# Patient Record
Sex: FEMALE | Race: WHITE | Employment: UNEMPLOYED | URBAN - NONMETROPOLITAN AREA
[De-identification: names, ages, dates, MRNs, and addresses within clinical notes are randomized per-mention and may not be internally consistent; named-entity substitution may affect disease eponyms.]

---

## 2017-09-19 ENCOUNTER — HOSPITAL ENCOUNTER (OUTPATIENT)
Age: 22
Setting detail: OBSERVATION
Discharge: HOME | End: 2017-09-19
Attending: OBSTETRICS & GYNECOLOGY | Admitting: OBSTETRICS & GYNECOLOGY
Payer: COMMERCIAL

## 2017-09-19 VITALS
OXYGEN SATURATION: 98 % | HEART RATE: 99 BPM | RESPIRATION RATE: 18 BRPM | BODY MASS INDEX: 32.99 KG/M2 | SYSTOLIC BLOOD PRESSURE: 126 MMHG | HEIGHT: 65 IN | WEIGHT: 198 LBS | TEMPERATURE: 97.3 F | DIASTOLIC BLOOD PRESSURE: 72 MMHG

## 2017-09-19 LAB — AMNISURE PATIENT RESULT: NORMAL

## 2017-09-19 PROCEDURE — G0379 DIRECT REFER HOSPITAL OBSERV: HCPCS

## 2017-09-19 PROCEDURE — G0378 HOSPITAL OBSERVATION PER HR: HCPCS

## 2017-09-19 PROCEDURE — 84112 EVAL AMNIOTIC FLUID PROTEIN: CPT

## 2017-09-19 RX ORDER — DIPHENHYDRAMINE HCL 25 MG
25 TABLET ORAL NIGHTLY PRN
Status: ON HOLD | COMMUNITY
End: 2020-07-22 | Stop reason: ALTCHOICE

## 2017-10-01 ENCOUNTER — HOSPITAL ENCOUNTER (OUTPATIENT)
Age: 22
Discharge: HOME | End: 2017-10-02
Attending: OBSTETRICS & GYNECOLOGY | Admitting: OBSTETRICS & GYNECOLOGY
Payer: COMMERCIAL

## 2017-10-01 RX ORDER — OXYCODONE HYDROCHLORIDE AND ACETAMINOPHEN 5; 325 MG/1; MG/1
2 TABLET ORAL ONCE
Status: COMPLETED | OUTPATIENT
Start: 2017-10-01 | End: 2017-10-02

## 2017-10-01 NOTE — IP AVS SNAPSHOT
After Visit Summary  (Discharge Instructions)    Medication List for Home    Based on the information you provided to us as well as any changes during this visit, the following is your updated medication list.  Compare this with your prescription bottles at home. If you have any questions or concerns, contact your primary care physician's office. Daily Medication List (This medication list can be shared with any healthcare provider who is helping you manage your medications)      ASK your doctor about these medications if you have questions        Last Dose    Next Dose Due AM NOON PM NIGHT    BENADRYL 25 MG tablet   Generic drug:  diphenhydrAMINE   Take 25 mg by mouth nightly as needed for Itching or Sleep                                                 Allergies as of 10/2/2017        Reactions    Amoxicillin-pot Clavulanate Nausea Only    Codeine Nausea Only      Immunizations as of 10/2/2017     No immunizations on file. Last Vitals          Most Recent Value    Temp      Pulse      Resp  18    BP           After Visit Summary    This summary was created for you. Thank you for entrusting your care to us. The following information includes details about your hospital/visit stay along with steps you should take to help with your recovery once you leave the hospital.  In this packet, you will find information about the topics listed below:    · Instructions about your medications including a list of your home medications  · A summary of your hospital visit  · Follow-up appointments once you have left the hospital  · Your care plan at home      You may receive a survey regarding the care you received during your stay. Your input is valuable to us. We encourage you to complete and return your survey in the envelope provided. We hope you will choose us in the future for your healthcare needs.           Patient Information     Patient Name Horace KAUR 1995 Care Plan Once You Return Home    This section includes instructions you will need to follow once you leave the hospital.  Your care team will discuss these with you, so you and those caring for you know how to best care for your health needs at home. This section may also include educational information about certain health topics that may be of help to you. Discharge Instructions       Home Undelivered Discharge Instructions    After Discharge Orders:           · Diet: regular diet   Increase fluid intake to 8-10 glasses of water daily    · Rest: normal activity as tolerated    Other instructions: Do kick counts once a day on your baby. Choose the time of day your baby is most active. Get in a comfortable lying or sitting position and time how long it takes to feel 10 kicks, twists, turns, swishes, or rolls. Call Your Doctor if Any of the Following Occurs   1. Leaking fluid from vagina with or without contractions  2. Bright red vaginal bleeding occurs that is as heavy as or heavier than a period  3. Regular contractions are longer, stronger, and closer together  4. Noticeable decreased fetal movement  5. Elevated temperature >100.5°F and chills  6. Blurred vision, spots before eyes, unrelievable headache, severe facial swelling, or upper abdominal pain  Contact physician or hospital OB unit if signs of premature labor occur at ?36 weeks  1. Persistent or rhythmic low back pain that feels different than you are used to  2. Menstrual like cramps  3. Intestinal cramps with or without diarrhea  4. Pelvic pressure or rhythmic tightening that feels different than you are used to  5. Watery discharge or a gush of fluid from your vagina  6. Vaginal bleeding as heavy as a period  General Information     Difference between:  False Labor True Labor   1. Contractions often are irregular and don't consistently get closer together (called Aleksander-Berman contractions) 1.  Contractions come at

## 2017-10-02 VITALS — BODY MASS INDEX: 31.32 KG/M2 | RESPIRATION RATE: 18 BRPM | HEIGHT: 65 IN | WEIGHT: 188 LBS

## 2017-10-02 PROCEDURE — 6370000000 HC RX 637 (ALT 250 FOR IP): Performed by: OBSTETRICS & GYNECOLOGY

## 2017-10-02 PROCEDURE — 59025 FETAL NON-STRESS TEST: CPT

## 2017-10-02 RX ADMIN — OXYCODONE HYDROCHLORIDE AND ACETAMINOPHEN 2 TABLET: 5; 325 TABLET ORAL at 00:21

## 2017-10-02 ASSESSMENT — PAIN SCALES - GENERAL: PAINLEVEL_OUTOF10: 7

## 2017-10-02 NOTE — FLOWSHEET NOTE
Discharge teaching provided, pt encouraged to make sure she is drinking 8-10 large glasses of water a day and doing her kick counts as she was instructed to do. Pt instructed to return or notify her physician is she notices any further leaking of fluids, vaginal bleeding heavier than a period, decreased fetal movement, temp not relieved by tylenol, contractions that are time able, or any other concerns she may have, pt voiced understanding, signatures obtained, copy of discharge papers provided to pt. EFM and toco removed pt up to change. Pt states she is comfortable going home.

## 2017-10-02 NOTE — FLOWSHEET NOTE
Dr. Vineet Guzman notified of pt arrival c/o leaking fluid and contractions rating 6/10. SVE, negative amnisure. Orders received.

## 2017-10-02 NOTE — FLOWSHEET NOTE
Pt here with c/o ctx and ? SROM. Pt states ctx started at 2000 and feeling baby move less. Pt gowned, EFM to be applied and amnisure to be obtained.

## 2017-10-13 ENCOUNTER — ANESTHESIA (OUTPATIENT)
Dept: LABOR AND DELIVERY | Age: 22
End: 2017-10-13
Payer: COMMERCIAL

## 2017-10-13 ENCOUNTER — ANESTHESIA EVENT (OUTPATIENT)
Dept: LABOR AND DELIVERY | Age: 22
End: 2017-10-13
Payer: COMMERCIAL

## 2017-10-13 ENCOUNTER — HOSPITAL ENCOUNTER (INPATIENT)
Age: 22
LOS: 2 days | Discharge: HOME | End: 2017-10-15
Attending: OBSTETRICS & GYNECOLOGY | Admitting: OBSTETRICS & GYNECOLOGY
Payer: COMMERCIAL

## 2017-10-13 LAB
ABO: NORMAL
AMPHETAMINE+METHAMPHETAMINE URINE SCREEN: NEGATIVE
ANTIBODY SCREEN: NORMAL
BARBITURATE QUANTITATIVE URINE: NEGATIVE
BASOPHILS # BLD: 0.3 %
BASOPHILS ABSOLUTE: 0 THOU/MM3 (ref 0–0.1)
BENZODIAZEPINE QUANTITATIVE URINE: NEGATIVE
CANNABINOID QUANTITATIVE URINE: NEGATIVE
COCAINE METABOLITE QUANTITATIVE URINE: NEGATIVE
EOSINOPHIL # BLD: 1.3 %
EOSINOPHILS ABSOLUTE: 0.2 THOU/MM3 (ref 0–0.4)
HCT VFR BLD CALC: 37.3 % (ref 37–47)
HEMOGLOBIN: 12.6 GM/DL (ref 12–16)
LYMPHOCYTES # BLD: 16.8 %
LYMPHOCYTES ABSOLUTE: 2.1 THOU/MM3 (ref 1–4.8)
MCH RBC QN AUTO: 31.5 PG (ref 27–31)
MCHC RBC AUTO-ENTMCNC: 33.7 GM/DL (ref 33–37)
MCV RBC AUTO: 93.5 FL (ref 81–99)
MONOCYTES # BLD: 7.5 %
MONOCYTES ABSOLUTE: 0.9 THOU/MM3 (ref 0.4–1.3)
NUCLEATED RED BLOOD CELLS: 0 /100 WBC
OPIATES, URINE: NEGATIVE
OXYCODONE: NEGATIVE
PDW BLD-RTO: 13.8 % (ref 11.5–14.5)
PHENCYCLIDINE QUANTITATIVE URINE: NEGATIVE
PLATELET # BLD: 214 THOU/MM3 (ref 130–400)
PMV BLD AUTO: 9.9 MCM (ref 7.4–10.4)
RBC # BLD: 3.99 MILL/MM3 (ref 4.2–5.4)
RBC # BLD: NORMAL 10*6/UL
RH FACTOR: NORMAL
SEG NEUTROPHILS: 74.1 %
SEGMENTED NEUTROPHILS ABSOLUTE COUNT: 9.3 THOU/MM3 (ref 1.8–7.7)
WBC # BLD: 12.6 THOU/MM3 (ref 4.8–10.8)

## 2017-10-13 PROCEDURE — 6360000002 HC RX W HCPCS: Performed by: NURSE ANESTHETIST, CERTIFIED REGISTERED

## 2017-10-13 PROCEDURE — 6360000002 HC RX W HCPCS: Performed by: OBSTETRICS & GYNECOLOGY

## 2017-10-13 PROCEDURE — 86592 SYPHILIS TEST NON-TREP QUAL: CPT

## 2017-10-13 PROCEDURE — 86850 RBC ANTIBODY SCREEN: CPT

## 2017-10-13 PROCEDURE — 2580000003 HC RX 258: Performed by: OBSTETRICS & GYNECOLOGY

## 2017-10-13 PROCEDURE — 36415 COLL VENOUS BLD VENIPUNCTURE: CPT

## 2017-10-13 PROCEDURE — 10907ZC DRAINAGE OF AMNIOTIC FLUID, THERAPEUTIC FROM PRODUCTS OF CONCEPTION, VIA NATURAL OR ARTIFICIAL OPENING: ICD-10-PCS | Performed by: OBSTETRICS & GYNECOLOGY

## 2017-10-13 PROCEDURE — 1200000000 HC SEMI PRIVATE

## 2017-10-13 PROCEDURE — A6258 TRANSPARENT FILM >16<=48 IN: HCPCS

## 2017-10-13 PROCEDURE — 3E0P3VZ INTRODUCTION OF HORMONE INTO FEMALE REPRODUCTIVE, PERCUTANEOUS APPROACH: ICD-10-PCS | Performed by: OBSTETRICS & GYNECOLOGY

## 2017-10-13 PROCEDURE — 2500000003 HC RX 250 WO HCPCS: Performed by: ANESTHESIOLOGY

## 2017-10-13 PROCEDURE — 86900 BLOOD TYPING SEROLOGIC ABO: CPT

## 2017-10-13 PROCEDURE — 85025 COMPLETE CBC W/AUTO DIFF WBC: CPT

## 2017-10-13 PROCEDURE — 6360000002 HC RX W HCPCS

## 2017-10-13 PROCEDURE — 7200000001 HC VAGINAL DELIVERY

## 2017-10-13 PROCEDURE — 86901 BLOOD TYPING SEROLOGIC RH(D): CPT

## 2017-10-13 PROCEDURE — 80307 DRUG TEST PRSMV CHEM ANLYZR: CPT

## 2017-10-13 RX ORDER — ACETAMINOPHEN 325 MG/1
650 TABLET ORAL EVERY 4 HOURS PRN
Status: DISCONTINUED | OUTPATIENT
Start: 2017-10-13 | End: 2017-10-15 | Stop reason: HOSPADM

## 2017-10-13 RX ORDER — DEXTROSE MONOHYDRATE 50 MG/ML
INJECTION, SOLUTION INTRAVENOUS
Status: DISCONTINUED
Start: 2017-10-13 | End: 2017-10-13

## 2017-10-13 RX ORDER — METHYLERGONOVINE MALEATE 0.2 MG/ML
200 INJECTION INTRAVENOUS SEE ADMIN INSTRUCTIONS
Status: DISCONTINUED | OUTPATIENT
Start: 2017-10-13 | End: 2017-10-15 | Stop reason: HOSPADM

## 2017-10-13 RX ORDER — MORPHINE SULFATE 2 MG/ML
2 INJECTION, SOLUTION INTRAMUSCULAR; INTRAVENOUS
Status: DISCONTINUED | OUTPATIENT
Start: 2017-10-13 | End: 2017-10-15 | Stop reason: HOSPADM

## 2017-10-13 RX ORDER — MORPHINE SULFATE 4 MG/ML
4 INJECTION, SOLUTION INTRAMUSCULAR; INTRAVENOUS
Status: DISCONTINUED | OUTPATIENT
Start: 2017-10-13 | End: 2017-10-13

## 2017-10-13 RX ORDER — LANOLIN 100 %
OINTMENT (GRAM) TOPICAL PRN
Status: DISCONTINUED | OUTPATIENT
Start: 2017-10-13 | End: 2017-10-15 | Stop reason: HOSPADM

## 2017-10-13 RX ORDER — ONDANSETRON 2 MG/ML
4 INJECTION INTRAMUSCULAR; INTRAVENOUS EVERY 6 HOURS PRN
Status: DISCONTINUED | OUTPATIENT
Start: 2017-10-13 | End: 2017-10-15 | Stop reason: HOSPADM

## 2017-10-13 RX ORDER — SODIUM CHLORIDE, SODIUM LACTATE, POTASSIUM CHLORIDE, CALCIUM CHLORIDE 600; 310; 30; 20 MG/100ML; MG/100ML; MG/100ML; MG/100ML
INJECTION, SOLUTION INTRAVENOUS CONTINUOUS
Status: DISCONTINUED | OUTPATIENT
Start: 2017-10-13 | End: 2017-10-15 | Stop reason: HOSPADM

## 2017-10-13 RX ORDER — ROPIVACAINE HYDROCHLORIDE 2 MG/ML
INJECTION, SOLUTION EPIDURAL; INFILTRATION; PERINEURAL PRN
Status: DISCONTINUED | OUTPATIENT
Start: 2017-10-13 | End: 2017-10-14 | Stop reason: SDUPTHER

## 2017-10-13 RX ORDER — IBUPROFEN 800 MG/1
800 TABLET ORAL 3 TIMES DAILY
Status: DISCONTINUED | OUTPATIENT
Start: 2017-10-13 | End: 2017-10-15 | Stop reason: HOSPADM

## 2017-10-13 RX ORDER — ACETAMINOPHEN 325 MG/1
650 TABLET ORAL EVERY 4 HOURS PRN
Status: DISCONTINUED | OUTPATIENT
Start: 2017-10-13 | End: 2017-10-13 | Stop reason: SDUPTHER

## 2017-10-13 RX ORDER — SODIUM CHLORIDE 0.9 % (FLUSH) 0.9 %
10 SYRINGE (ML) INJECTION EVERY 12 HOURS SCHEDULED
Status: DISCONTINUED | OUTPATIENT
Start: 2017-10-13 | End: 2017-10-15 | Stop reason: HOSPADM

## 2017-10-13 RX ORDER — HYDROCODONE BITARTRATE AND ACETAMINOPHEN 5; 325 MG/1; MG/1
1 TABLET ORAL EVERY 4 HOURS PRN
Status: DISCONTINUED | OUTPATIENT
Start: 2017-10-13 | End: 2017-10-15 | Stop reason: HOSPADM

## 2017-10-13 RX ORDER — CARBOPROST TROMETHAMINE 250 UG/ML
250 INJECTION, SOLUTION INTRAMUSCULAR PRN
Status: DISCONTINUED | OUTPATIENT
Start: 2017-10-13 | End: 2017-10-14 | Stop reason: SDUPTHER

## 2017-10-13 RX ORDER — MORPHINE SULFATE 4 MG/ML
4 INJECTION, SOLUTION INTRAMUSCULAR; INTRAVENOUS
Status: DISCONTINUED | OUTPATIENT
Start: 2017-10-13 | End: 2017-10-15 | Stop reason: HOSPADM

## 2017-10-13 RX ORDER — LIDOCAINE HYDROCHLORIDE 10 MG/ML
30 INJECTION, SOLUTION EPIDURAL; INFILTRATION; INTRACAUDAL; PERINEURAL PRN
Status: DISCONTINUED | OUTPATIENT
Start: 2017-10-13 | End: 2017-10-13

## 2017-10-13 RX ORDER — CARBOPROST TROMETHAMINE 250 UG/ML
250 INJECTION, SOLUTION INTRAMUSCULAR
Status: DISPENSED | OUTPATIENT
Start: 2017-10-13 | End: 2017-10-13

## 2017-10-13 RX ORDER — NALOXONE HYDROCHLORIDE 0.4 MG/ML
0.4 INJECTION, SOLUTION INTRAMUSCULAR; INTRAVENOUS; SUBCUTANEOUS PRN
Status: DISCONTINUED | OUTPATIENT
Start: 2017-10-13 | End: 2017-10-13

## 2017-10-13 RX ORDER — HYDROCODONE BITARTRATE AND ACETAMINOPHEN 5; 325 MG/1; MG/1
2 TABLET ORAL EVERY 4 HOURS PRN
Status: DISCONTINUED | OUTPATIENT
Start: 2017-10-13 | End: 2017-10-15 | Stop reason: HOSPADM

## 2017-10-13 RX ORDER — TERBUTALINE SULFATE 1 MG/ML
0.25 INJECTION, SOLUTION SUBCUTANEOUS ONCE
Status: DISCONTINUED | OUTPATIENT
Start: 2017-10-13 | End: 2017-10-13

## 2017-10-13 RX ORDER — IBUPROFEN 800 MG/1
800 TABLET ORAL EVERY 8 HOURS PRN
Status: DISCONTINUED | OUTPATIENT
Start: 2017-10-13 | End: 2017-10-13

## 2017-10-13 RX ORDER — SODIUM CHLORIDE, SODIUM LACTATE, POTASSIUM CHLORIDE, CALCIUM CHLORIDE 600; 310; 30; 20 MG/100ML; MG/100ML; MG/100ML; MG/100ML
INJECTION, SOLUTION INTRAVENOUS CONTINUOUS
Status: DISCONTINUED | OUTPATIENT
Start: 2017-10-13 | End: 2017-10-13

## 2017-10-13 RX ORDER — MORPHINE SULFATE 2 MG/ML
2 INJECTION, SOLUTION INTRAMUSCULAR; INTRAVENOUS
Status: DISCONTINUED | OUTPATIENT
Start: 2017-10-13 | End: 2017-10-13

## 2017-10-13 RX ORDER — DIPHENHYDRAMINE HCL 25 MG
25 TABLET ORAL EVERY 4 HOURS PRN
Status: DISCONTINUED | OUTPATIENT
Start: 2017-10-13 | End: 2017-10-15 | Stop reason: HOSPADM

## 2017-10-13 RX ORDER — ONDANSETRON 2 MG/ML
8 INJECTION INTRAMUSCULAR; INTRAVENOUS EVERY 6 HOURS PRN
Status: DISCONTINUED | OUTPATIENT
Start: 2017-10-13 | End: 2017-10-13

## 2017-10-13 RX ORDER — DOCUSATE SODIUM 100 MG/1
100 CAPSULE, LIQUID FILLED ORAL 2 TIMES DAILY PRN
Status: DISCONTINUED | OUTPATIENT
Start: 2017-10-13 | End: 2017-10-15 | Stop reason: HOSPADM

## 2017-10-13 RX ORDER — ROPIVACAINE HYDROCHLORIDE 2 MG/ML
INJECTION, SOLUTION EPIDURAL; INFILTRATION; PERINEURAL
Status: DISCONTINUED
Start: 2017-10-13 | End: 2017-10-13

## 2017-10-13 RX ORDER — ZOLPIDEM TARTRATE 10 MG/1
10 TABLET ORAL NIGHTLY PRN
Status: DISCONTINUED | OUTPATIENT
Start: 2017-10-14 | End: 2017-10-15 | Stop reason: HOSPADM

## 2017-10-13 RX ORDER — FERROUS SULFATE 325(65) MG
325 TABLET ORAL
Status: DISCONTINUED | OUTPATIENT
Start: 2017-10-14 | End: 2017-10-15 | Stop reason: HOSPADM

## 2017-10-13 RX ORDER — ONDANSETRON 4 MG/1
8 TABLET, FILM COATED ORAL EVERY 8 HOURS PRN
Status: DISCONTINUED | OUTPATIENT
Start: 2017-10-13 | End: 2017-10-15 | Stop reason: HOSPADM

## 2017-10-13 RX ORDER — PENICILLIN G POTASSIUM 5000000 [IU]/1
INJECTION, POWDER, FOR SOLUTION INTRAMUSCULAR; INTRAVENOUS
Status: DISCONTINUED
Start: 2017-10-13 | End: 2017-10-13

## 2017-10-13 RX ORDER — DIPHENHYDRAMINE HYDROCHLORIDE 50 MG/ML
25 INJECTION INTRAMUSCULAR; INTRAVENOUS EVERY 4 HOURS PRN
Status: DISCONTINUED | OUTPATIENT
Start: 2017-10-13 | End: 2017-10-13

## 2017-10-13 RX ORDER — SODIUM CHLORIDE 0.9 % (FLUSH) 0.9 %
10 SYRINGE (ML) INJECTION PRN
Status: DISCONTINUED | OUTPATIENT
Start: 2017-10-13 | End: 2017-10-15 | Stop reason: HOSPADM

## 2017-10-13 RX ORDER — ONDANSETRON 2 MG/ML
4 INJECTION INTRAMUSCULAR; INTRAVENOUS EVERY 6 HOURS PRN
Status: DISCONTINUED | OUTPATIENT
Start: 2017-10-13 | End: 2017-10-13

## 2017-10-13 RX ORDER — BUTORPHANOL TARTRATE 1 MG/ML
1 INJECTION, SOLUTION INTRAMUSCULAR; INTRAVENOUS
Status: DISCONTINUED | OUTPATIENT
Start: 2017-10-13 | End: 2017-10-13

## 2017-10-13 RX ORDER — METHYLERGONOVINE MALEATE 0.2 MG/ML
200 INJECTION INTRAVENOUS PRN
Status: DISCONTINUED | OUTPATIENT
Start: 2017-10-13 | End: 2017-10-13 | Stop reason: SDUPTHER

## 2017-10-13 RX ADMIN — SODIUM CHLORIDE, POTASSIUM CHLORIDE, SODIUM LACTATE AND CALCIUM CHLORIDE: 600; 310; 30; 20 INJECTION, SOLUTION INTRAVENOUS at 19:58

## 2017-10-13 RX ADMIN — SODIUM CHLORIDE, POTASSIUM CHLORIDE, SODIUM LACTATE AND CALCIUM CHLORIDE: 600; 310; 30; 20 INJECTION, SOLUTION INTRAVENOUS at 15:18

## 2017-10-13 RX ADMIN — Medication 1 MILLI-UNITS/MIN: at 15:30

## 2017-10-13 RX ADMIN — DEXTROSE MONOHYDRATE 2.5 MILLION UNITS: 50 INJECTION, SOLUTION INTRAVENOUS at 19:16

## 2017-10-13 RX ADMIN — ROPIVACAINE HYDROCHLORIDE 10 ML: 2 INJECTION, SOLUTION EPIDURAL; INFILTRATION at 18:00

## 2017-10-13 RX ADMIN — DEXTROSE MONOHYDRATE 5 MILLION UNITS: 5 INJECTION INTRAVENOUS at 15:21

## 2017-10-13 RX ADMIN — Medication 15 ML/HR: at 18:00

## 2017-10-13 RX ADMIN — SODIUM CHLORIDE, POTASSIUM CHLORIDE, SODIUM LACTATE AND CALCIUM CHLORIDE: 600; 310; 30; 20 INJECTION, SOLUTION INTRAVENOUS at 14:30

## 2017-10-13 RX ADMIN — ONDANSETRON 8 MG: 2 INJECTION INTRAMUSCULAR; INTRAVENOUS at 19:31

## 2017-10-13 NOTE — PLAN OF CARE
Problem: Anxiety:  Goal: Level of anxiety will decrease  Level of anxiety will decrease   Outcome: Ongoing  Patient appears calm and cooperative, s/o at bedside, RN offering positive reassurance    Problem: Pain - Acute:  Goal: Able to cope with pain  Able to cope with pain   Outcome: Ongoing  Patient is comfortable with labor epidural    Problem: Breathing Pattern - Ineffective:  Goal: Able to breathe comfortably  Able to breathe comfortably   Outcome: Ongoing  Easy and unlabored respirations, denies SOB    Problem: Fluid Volume - Imbalance:  Goal: Absence of intrapartum hemorrhage signs and symptoms  Absence of intrapartum hemorrhage signs and symptoms   Outcome: Ongoing  No vaginal bleeding noted, will continue to monitor    Problem: Infection - Intrapartum Infection:  Goal: Will show no infection signs and symptoms  Will show no infection signs and symptoms   Outcome: Ongoing  Vitals stable, remains afebrile, receiving PCN for GBS    Problem: Labor Process - Prolonged:  Goal: Uterine contractions within specified parameters  Uterine contractions within specified parameters   Outcome: Ongoing  IUPC in place tracing contraction pattern    Problem: Urinary Retention:  Goal: Urinary elimination within specified parameters  Urinary elimination within specified parameters   Outcome: Ongoing  Munoz catheter in place draining clear yellow urine    Problem: Discharge Planning:  Goal: Discharged to appropriate level of care  Discharged to appropriate level of care   Outcome: Ongoing  Patient aware of 2 hour recovery period on L&D then transfer to mom/baby unit for remainder of stay    Problem: Falls - Risk of:  Goal: Will remain free from falls  Will remain free from falls   Outcome: Ongoing  Patient aware of needing to remain in bed since epidural, call light within reach, bed locked in lowest position. Comments: Care plan reviewed with patient and s/o.   Patient and s/o verbalize understanding of the plan of care and contribute to goal setting.

## 2017-10-13 NOTE — H&P
[address] [address]  History and Physical Update    Pt Name: Alfonso Bustamante  MRN: [id number]  YOB: 1995  Date of evaluation: 10/13/2017    [] I have examined the patient and reviewed the H&P/Consult and there are no changes to the patient or plans. [x] I have examined the patient and reviewed the H&P/Consult and have noted the following changes:   Pt with decreased fetal movement in the office. Sent for IOL  CVX /2. AROM clear. Anticipate .          Lizzy Coates MD  Electronically signed 10/13/2017 at 5:24 PM

## 2017-10-13 NOTE — PLAN OF CARE
labor. Pt will be discharged when discharge criteria met. Problem: Falls - Risk of:  Goal: Will remain free from falls  Will remain free from falls  Outcome: Ongoing  Pt up with assistance. Pt using call light appropriately.

## 2017-10-13 NOTE — ANESTHESIA PRE PROCEDURE
Department of Anesthesiology  Preprocedure Note       Name:  Huma Alcala   Age:  25 y.o.  :  1995                                          MRN:  [id number]         Date:  10/13/2017      Surgeon: * No surgeons listed *    Procedure: * No procedures listed *    Medications prior to admission:   Prior to Admission medications    Medication Sig Start Date End Date Taking?  Authorizing Provider   diphenhydrAMINE (BENADRYL) 25 MG tablet Take 25 mg by mouth nightly as needed for Itching or Sleep   Yes Historical Provider, MD       Current medications:    Current Facility-Administered Medications   Medication Dose Route Frequency Provider Last Rate Last Dose    lactated ringers infusion   Intravenous Continuous Mary Carmen Luo  mL/hr at 10/13/17 1518      lidocaine PF 1 % injection 30 mL  30 mL Other PRN Mary Carmen Luo MD        butorphanol (STADOL) injection 1 mg  1 mg Intravenous Q1H PRN Mary Carmen Luo MD        acetaminophen (TYLENOL) tablet 650 mg  650 mg Oral Q4H PRN Mary Carmen Luo MD        ibuprofen (ADVIL;MOTRIN) tablet 800 mg  800 mg Oral Q8H PRN Mary Carmen Luo MD        morphine (PF) injection 2 mg  2 mg Intravenous Q2H PRN Mary Carmen Luo MD        Or    morphine (PF) injection 4 mg  4 mg Intravenous Q2H PRN Mary Carmen Luo MD        oxytocin (PITOCIN) 30 units in 500 mL infusion  1 alexandre-units/min Intravenous Continuous Mary Carmen Luo MD 6 mL/hr at 10/13/17 1700 6 alexandre-units/min at 10/13/17 1700    diphenhydrAMINE (BENADRYL) injection 25 mg  25 mg Intravenous Q4H PRN Mary Carmen Luo MD        [facility]etron [facility]) injection 8 mg  8 mg Intravenous Q6H PRN Mary Carmen Luo MD        terbutaline (BRETHINE) injection 0.25 mg  0.25 mg Subcutaneous Once Mary Carmen Luo MD        oxytocin (PITOCIN) 30 units in 500 mL infusion  1 alexandre-units/min Intravenous Continuous PRN Mary Carmen Luo MD       [address] [place] [address] from Last 3 Encounters:   10/13/17 194 lb (88 kg)   10/01/17 188 lb (85.3 kg)   09/19/17 198 lb (89.8 kg)     Body mass index is 32.28 kg/m². CBC:   Lab Results   Component Value Date    WBC 12.6 10/13/2017    RBC 3.99 10/13/2017    HGB 12.6 10/13/2017    HCT 37.3 10/13/2017    MCV 93.5 10/13/2017    RDW 13.8 10/13/2017     10/13/2017       CMP: No results found for: NA, K, CL, CO2, BUN, CREATININE, GFRAA, AGRATIO, LABGLOM, GLUCOSE, PROT, CALCIUM, BILITOT, ALKPHOS, AST, ALT    POC Tests: No results for input(s): POCGLU, POCNA, POCK, POCCL, POCBUN, POCHEMO, POCHCT in the last 72 hours. Coags: No results found for: PROTIME, INR, APTT    HCG (If Applicable):   Lab Results   Component Value Date    PREGTESTUR NEGATIVE 01/15/2013        ABGs: No results found for: PHART, PO2ART, TTM8IOF, FTQ6JER, BEART, G3CXYAKL     Type & Screen (If Applicable):  Lab Results   Component Value Date    [address] POS 10/13/2017       Anesthesia Evaluation  Patient summary reviewed and Nursing notes reviewed  Airway: Mallampati: II  TM distance: >3 FB   Neck ROM: full  Mouth opening: > = 3 FB Dental: normal exam         Pulmonary:normal exam    (+) asthma:                            Cardiovascular:negative ROS                      Neuro/Psych:   {neg ROS              GI/Hepatic/Renal: neg ROS            Endo/Other: negative ROS                    Abdominal:           Vascular:                                      Anesthesia Plan      epidural     ASA 2             Anesthetic plan and risks discussed with patient and spouse. Plan discussed with attending.                   Ori Esparza CRNA   10/13/2017

## 2017-10-13 NOTE — FLOWSHEET NOTE
Gr 2 P 1 admitted to labor and delivery from Dr. Sharon Trujillo' office for induction for decreased fetal movement.

## 2017-10-14 LAB — RPR: NONREACTIVE

## 2017-10-14 PROCEDURE — G0008 ADMIN INFLUENZA VIRUS VAC: HCPCS | Performed by: OBSTETRICS & GYNECOLOGY

## 2017-10-14 PROCEDURE — 6370000000 HC RX 637 (ALT 250 FOR IP): Performed by: OBSTETRICS & GYNECOLOGY

## 2017-10-14 PROCEDURE — 1200000000 HC SEMI PRIVATE

## 2017-10-14 PROCEDURE — 6360000002 HC RX W HCPCS: Performed by: OBSTETRICS & GYNECOLOGY

## 2017-10-14 PROCEDURE — 90686 IIV4 VACC NO PRSV 0.5 ML IM: CPT | Performed by: OBSTETRICS & GYNECOLOGY

## 2017-10-14 RX ADMIN — HYDROCODONE BITARTRATE AND ACETAMINOPHEN 2 TABLET: 5; 325 TABLET ORAL at 09:28

## 2017-10-14 RX ADMIN — HYDROCODONE BITARTRATE AND ACETAMINOPHEN 2 TABLET: 5; 325 TABLET ORAL at 22:14

## 2017-10-14 RX ADMIN — HYDROCODONE BITARTRATE AND ACETAMINOPHEN 2 TABLET: 5; 325 TABLET ORAL at 18:12

## 2017-10-14 RX ADMIN — IBUPROFEN 800 MG: 800 TABLET, FILM COATED ORAL at 16:13

## 2017-10-14 RX ADMIN — ZOLPIDEM TARTRATE 10 MG: 10 TABLET, FILM COATED ORAL at 23:59

## 2017-10-14 RX ADMIN — IBUPROFEN 800 MG: 800 TABLET, FILM COATED ORAL at 08:01

## 2017-10-14 RX ADMIN — HYDROCODONE BITARTRATE AND ACETAMINOPHEN 2 TABLET: 5; 325 TABLET ORAL at 13:48

## 2017-10-14 RX ADMIN — DOCUSATE SODIUM 100 MG: 100 CAPSULE, LIQUID FILLED ORAL at 22:14

## 2017-10-14 RX ADMIN — IBUPROFEN 800 MG: 800 TABLET, FILM COATED ORAL at 00:07

## 2017-10-14 RX ADMIN — INFLUENZA A VIRUS A/SINGAPORE/GP1908/2015 IVR-180A (H1N1) ANTIGEN (PROPIOLACTONE INACTIVATED), INFLUENZA A VIRUS A/HONG KONG/4801/2014 X-263B (H3N2) ANTIGEN (PROPIOLACTONE INACTIVATED), INFLUENZA B VIRUS B/BRISBANE/46/2015 ANTIGEN (PROPIOLACTONE INACTIVATED), AND INFLUENZA B VIRUS B/PHUKET/3073/2013 BVR-1B ANTIGEN (PROPIOLACTONE INACTIVATED) 0.5 ML: 15; 15; 15; 15 INJECTION, SUSPENSION INTRAMUSCULAR at 15:12

## 2017-10-14 ASSESSMENT — PAIN SCALES - GENERAL
PAINLEVEL_OUTOF10: 7
PAINLEVEL_OUTOF10: 8
PAINLEVEL_OUTOF10: 5
PAINLEVEL_OUTOF10: 7
PAINLEVEL_OUTOF10: 8
PAINLEVEL_OUTOF10: 5
PAINLEVEL_OUTOF10: 7
PAINLEVEL_OUTOF10: 4

## 2017-10-14 NOTE — ANESTHESIA POSTPROCEDURE EVALUATION
Department of Anesthesiology  Postprocedure Note    Patient: Allene Prader  MRN: [id number]  YOB: 1995  Date of evaluation: 10/14/2017  Time:  1:43 PM     Procedure Summary     Date:  10/13/17 Room / Location:      Anesthesia Start:  1750 Anesthesia Stop:  2037    Procedure:  ANESTHESIA LABOR ANALGESIA Diagnosis:      Scheduled Providers:   Responsible Provider:  Lainey Sandoval MD    Anesthesia Type:  epidural ASA Status:  2          Anesthesia Type: epidural    Kuldeep Phase I: Kuldeep Score: 9    Kuldeep Phase II: Kuldeep Score: 10    Last vitals: Reviewed and per EMR flowsheets. Anesthesia Post Evaluation    Patient location during evaluation: bedside  Patient participation: complete - patient participated  Level of consciousness: awake and alert  Pain score: 9 (headache and neck pain)  Airway patency: patent  Nausea & Vomiting: no nausea and no vomiting  Complications: yes  Specific complications: back pain, neck pain and postural headache  Cardiovascular status: hemodynamically stable  Respiratory status: acceptable and room air  Hydration status: stable  Comments: Patient states headache is worse when sitting up and feels better laying down. Patient has been instructed to continue in the supine position when possible and to take fluids by mouth to stay hydrated. Patient is ordered Norco PO for her neck pain/heacache per Dr. Daniela Gallo. Anesthesia will follow-up and reassess severity of the patient's symptoms and evaluate the need for an epidural blood patch.

## 2017-10-14 NOTE — FLOWSHEET NOTE
Patient awake states no pain in head or neck at this time,on phone in bed,heating pad to neck continues.

## 2017-10-14 NOTE — FLOWSHEET NOTE
Patient transported to [phone] via wheelchair with  in arms in stable condition, FOB at side. Report given to MILLY Castro RN at bedside.

## 2017-10-14 NOTE — FLOWSHEET NOTE
Patient states headache,neck pain a 8 on pain scale. crn anesthetist checked on patient. Patient medicated,heating pad to neck,encouraged,darken room,drink lots of fluid,lie flat in bed.

## 2017-10-14 NOTE — L&D DELIVERY SUMMARY NOTE
Department of Obstetrics and Gynecology  Spontaneous Vaginal Delivery Note      Pt Name: Rex Jaime  MRN: [id number] [place] #: [de-identified]  YOB: 1995  Procedure Performed By: Chanel Price MD      Pre-operative Diagnosis:  Term pregnancy, Induced labor and Pregnancy complicated by: Decreased fetal movement  Post-operative Diagnosis: Same, delivered. Procedure:  Spontaneous vaginal delivery  Surgeon:  Vance Delacruz    Information for the patient's :  Marjan Larson [[id number]]          Anesthesia:  epidural anesthesia  Estimated blood loss:  300ml  Specimen:  Placenta not sent to pathology   Complications:  precipotous delivery from 7-delivered in 20 minutes  Condition:  infant stable to general nursery and mother stable    Details of Procedure: The patient is a 25 y.o. female at 36w3d   OB History      Para Term  AB Living    2 1            SAB TAB Ectopic Molar Multiple Live Births                      who was admitted for induction. She received the following interventions: ARBOW and IV Pitocin induction. The patient progressed well,did receive an epidural, became complete and started to push. She was placed in the dorsal lithotomy position and prepped. She delivered the vertex over an intact perineum . The rest of the infant delivered atraumatically, placed on mother abdomen. The cord was clamped and cut and infant handed off to the waiting nurse for evaluation after mom had adequate time for bonding unless needed to be evaluated sooner. The placenta delivered intact, whole and that the umbilical cord had three vessels noted. The perineum and vagina were explored and a no lacerations were found. A vaginal sweep was performed and there were no retained products and 1 needle was taken off the field. The count was correct.     Delivery Summary:  Information for the patient's :  Marjan Larson [[id number]]                PMH:  Past Medical

## 2017-10-14 NOTE — FLOWSHEET NOTE
Patient c/o headache,neck discomfort,dr gray Rounded on patient,patient medicated,encoughaged to lie flat in bed,darken room and given  Caffeine drink.

## 2017-10-15 VITALS
HEART RATE: 63 BPM | RESPIRATION RATE: 17 BRPM | HEIGHT: 65 IN | SYSTOLIC BLOOD PRESSURE: 112 MMHG | WEIGHT: 194 LBS | TEMPERATURE: 98 F | DIASTOLIC BLOOD PRESSURE: 70 MMHG | OXYGEN SATURATION: 97 % | BODY MASS INDEX: 32.32 KG/M2

## 2017-10-15 PROCEDURE — 6370000000 HC RX 637 (ALT 250 FOR IP): Performed by: OBSTETRICS & GYNECOLOGY

## 2017-10-15 RX ORDER — HYDROCODONE BITARTRATE AND ACETAMINOPHEN 5; 325 MG/1; MG/1
2 TABLET ORAL EVERY 4 HOURS PRN
Qty: 40 TABLET | Refills: 0 | Status: SHIPPED | OUTPATIENT
Start: 2017-10-15 | End: 2017-10-22

## 2017-10-15 RX ADMIN — DOCUSATE SODIUM 100 MG: 100 CAPSULE, LIQUID FILLED ORAL at 08:02

## 2017-10-15 RX ADMIN — IBUPROFEN 800 MG: 800 TABLET, FILM COATED ORAL at 10:00

## 2017-10-15 RX ADMIN — HYDROCODONE BITARTRATE AND ACETAMINOPHEN 2 TABLET: 5; 325 TABLET ORAL at 12:08

## 2017-10-15 RX ADMIN — HYDROCODONE BITARTRATE AND ACETAMINOPHEN 2 TABLET: 5; 325 TABLET ORAL at 08:03

## 2017-10-15 RX ADMIN — IBUPROFEN 800 MG: 800 TABLET, FILM COATED ORAL at 00:01

## 2017-10-15 ASSESSMENT — PAIN SCALES - GENERAL
PAINLEVEL_OUTOF10: 8
PAINLEVEL_OUTOF10: 7
PAINLEVEL_OUTOF10: 5

## 2017-10-15 NOTE — FLOWSHEET NOTE
Infant has roomed in with mother this shift except for after she took Ambien to sleep. She was tearful and hadn't slept. Benefits of rooming in discussed.

## 2017-10-15 NOTE — FLOWSHEET NOTE
Dr. Sandra Castro anesthesiologist in to speak with mom . Assessment completed . Mom verbalized understanding of Dr. Sandra Castro instructions.

## 2017-10-15 NOTE — PLAN OF CARE
Problem: Pain - Acute:  Goal: Pain level will decrease  Pain level will decrease    Outcome: Ongoing  Pain controlled with po meds      Problem: Fluid Volume - Imbalance:  Goal: Absence of postpartum hemorrhage signs and symptoms  Absence of postpartum hemorrhage signs and symptoms   Outcome: Ongoing  Vaginal bleeding WNL, no clots or foul odors. Problem: Discharge Planning:  Goal: Discharged to appropriate level of care  Discharged to appropriate level of care   Outcome: Ongoing  No discharge     Problem: Infection - Risk of, Puerperal Infection:  Goal: Will show no infection signs and symptoms  Will show no infection signs and symptoms   Outcome: Ongoing  Vitals:    10/15/17 0800   BP: 112/70   Pulse: 63   Resp: 17   Temp: 98 °F (36.7 °C)   SpO2:          Problem: Mood - Altered:  Goal: Mood stable  Mood stable   Outcome: Ongoing  Bonding with baby, participating in infant care. Problem: Pain:  Goal: Pain level will decrease  Pain level will decrease    Outcome: Ongoing  Pain controlled with po meds      Comments: Care plan reviewed with patient and she contributes to goal setting and voices understanding of plan of care.

## 2017-10-15 NOTE — FLOWSHEET NOTE
Postpartum education brochure given, teaching complete. [place] postpartum depression screening discussed with patient, instructed to contact her healthcare provider if her score is > 10. Patient voiced understanding.  Mother's blood type is O+

## 2017-11-21 NOTE — L&D DELIVERY SUMMARY NOTE
[address] [place] [address]                              LABOR AND DELIVERY NOTE    PATIENT NAME: Jonathan Diego                       :        1995  MED REC NO:   [id number]                           ROOM:       0006  ACCOUNT NO:   [de-identified]                           ADMIT DATE: 10/01/2017  PROVIDER:     Everton Flores. Alicja Saini M.D.    Rivas Hernandezd:  10/01/2017    Te patient presented to Labor and Delivery at 37 weeks complaining of  contractions. She was found to be in false labor. She had a non-stress  test done, that was reactive.         Rohit Cavazos M.D.    D: 2017 8:57:56       T: 2017 9:44:33     [email or web address]  Job#: [id number]     Doc#: [id number]    CC:

## 2018-03-09 ENCOUNTER — NURSE TRIAGE (OUTPATIENT)
Dept: ADMINISTRATIVE | Age: 23
End: 2018-03-09

## 2018-03-10 NOTE — TELEPHONE ENCOUNTER
Message from [employer] sent at 3/9/2018  8:31 PM EST     Summary: black muscouy stool- had Cdiff    Had her gallbladder removed a cpl weeks ago and now she had cdiff.  Dr Brandi Estevez put her on some meds but told her if she had black or tarry stool for her to call or go to the ER.  She just had a black mucousy stool. Angie Zhu also had abdominal pain.  Please advise. \"I saw Dr Brandi Estevez on Wed and he said to call or go to ED if I had any black tarry stool to call and put me on clear liquids for one week and I have a bad case of C diff. My stomach feels like is about to explode.  \"    I tried to call Dr Brandi Estevez but only # was [facility] which I called they gave me [phone] which was wound clinic and no # to reach  or advised her to go to ED

## 2018-03-10 NOTE — TELEPHONE ENCOUNTER
Reason for Disposition   Tarry or jet black-colored stool (not dark green)    Answer Assessment - Initial Assessment Questions  1. COLOR: \"What color is it? \" \"Is that color in part or all of the stool? \"      Black and tarry and mucousy  2. ONSET: \"When was the unusual color first noted? \"      Today after she ate 2 pieces of pizza and was to be on water and pedilyte for one week since past Wed  3. CAUSE: \"Have you eaten any food or taken any medicine of this color? \" (See listing in BACKGROUND)      no  4. OTHER SYMPTOMS: \"Do you have any other symptoms? \" (e.g., diarrhea, jaundice, abdominal pain, fever).       abd pain and nausea    Protocols used: RECTAL BLEEDING-ADULT-AH, STOOLS - UNUSUAL COLOR-ADULT-AH

## 2018-07-03 NOTE — FLOWSHEET NOTE
Progress Note Hospital Medicine    Patient: Janina Saunders Date: 7/3/2018   female, 44 year old  Admit Date: 6/30/2018   Attending: Atul Anguiano MD      Subjective:  Janina Saunders is a 44 year old female who is being seen in follow up for Acute pyelonephritis     Constipation better, no fever, no chills. Pain improving. Poor sleep- feeling tired. Afebrile           Medications: personally reviewed today in this patient's active orders section of epic  Allergies:   Allergies as of 06/30/2018 - Reviewed 06/30/2018   Allergen Reaction Noted   • Allergy Other (See Comments)    • Demerol Other (See Comments)    • Morphine Other (See Comments)    • Sulfa drugs cross reactors SWELLING, SHORTNESS OF BREATH, and Nausea & Vomiting    • Flagyl [metronidazole hcl] NAUSEA    • Unable to obtain [unknown] Other (See Comments) 04/16/2015       PHYSICAL EXAM:  Visit Vitals  BP (!) 169/94 (BP Location: RUE, Patient Position: Semi-Weathers's) Comment: RN notified   Pulse 70   Temp 99 °F (37.2 °C) (Oral)   Resp 18   Ht 5' 9\" (1.753 m)   Wt 92.3 kg   LMP 05/28/2018 (Exact Date)   SpO2 91%   BMI 30.05 kg/m²     General: A & O X 3 in no acute distress, normal cephalic/atraumatic, Mood and Affect appropriate  Lungs: Clear to auscultation bilaterally  Heart:  Regular rate and rhythm and S1, S2 present  Abdomen: Right flank pain, CVA tenderness;  + B.S.; No guarding or rebound; No peritoneal signs. Cellulitis and induration over laparoscopic site  Extremities: cyanosis absent; no obvious lesions or wounds  Neurologic:  CN 2-12 Grossly intact as best as patient can cooperate with exam, strength is bilaterally intact in all 4 extremities , sensation is grossly intact throughout    Labs:    Recent Labs  Lab 07/03/18 0657 07/02/18 0630 07/01/18  0642   WBC 8.7 11.5* 10.8   RBC 3.39* 3.47* 3.60*   HGB 10.4* 10.6* 11.2*   HCT 32.6* 33.6* 34.2*    208 195   SEG 71 75 63       Recent Labs  Lab 07/03/18 0657 07/02/18  0630  Patient admitted to [phone] from L&D via wheelchair. Report received from AMINA Ortiz RN. Assisted into bed. Oriented to room and plan of care discussed. Call light and bedside table within reach. Side rails up x2. Due to void x 1. Instructed to call nurse for assistance. Verbalized understanding. 07/01/18  0642 06/30/18  1757   SODIUM 139 138 136 137   POTASSIUM 4.0 4.8 4.2 4.2   CHLORIDE 108* 109* 105 103   CO2 23 21 21 24   BUN 17 26* 24* 21*   CREATININE 1.88* 2.69* 2.56* 1.95*   GFRNA 32 21 22 31   GLUCOSE 86 96 96 129*   CALCIUM 8.1* 8.1* 7.6* 8.8   ALBUMIN  --   --   --  3.4*   AST  --   --   --  31   GPT  --   --   --  29   BILIRUBIN  --   --   --  0.4   ALKPT  --   --   --  86     • heparin (porcine)  5,000 Units Subcutaneous 3 times per day   • escitalopram  10 mg Oral Daily   • vitamin - therapeutic multivitamins w/minerals  1 tablet Oral Daily   • piperacillin-tazobactam (ZOSYN) extended interval IVPB  4.5 g Intravenous 3 times per day   • polyethylene glycol  17 g Oral Daily   • sodium chloride (PF)  2 mL Injection 2 times per day       Assessment & Plan:     · Acute Pyelonephritis with hematuria, Right flank and lower abdominal pain - secondary to recent UTI (pt did not complete full course of Ciprofloxacin and Fluconazole at home) vs seeding from recent post-operative wound infection.   - No hydronephrosis but fluid collection lateral to right kidney and bilateral stranding of the perirenal fat (CT abdomen/pelvis)  - Urology was called by ER, no acute intervention needed, no calculus or obstruction. Hematuria is clearing up.  - Aggressive IV hydration @ 150cc/hr. Monitor for volume overload, Echo 2014 EF 58%  - OBGyn consulted, Dr Lion evaluated pt  - No growth in blood and urine culture with mixed growth, repeat pending  - On Zosyn now  - Pain control, supportive care    · Acute Renal failure - improving, secondary to above; possible ATN although no hypotension noted  - Creat 0.90>>1.95>>2.56>>2.69>>1.88  - IV fluids overnight, po fluid intake encouraged  - Avoid nephrotoxic meds and hypotension    · Right Lower Quadrant Wound Infection - erythema/mild cellulitis noted at site  - Fu with OBGyn in am, Dr Lion to see pt tomorrow.     · Depression - mood affect stable.     · Asthma, moderate  persistent stable    · Constipation- bowel regimen ordered    · DVT Prophylaxis- SQ heparin. SCDS and TEDs. Pt ambulating to bathroom    Central Line- none  Parker Catheter- none    Code status: Full Resuscitation    Disposition: Continue in-patient care. Anticipate dc in am    Atul Anguiano MD  Hospitalist  7/3/2018  12:57 PM

## 2020-05-14 ENCOUNTER — TELEPHONE (OUTPATIENT)
Dept: SURGERY | Age: 25
End: 2020-05-14

## 2020-07-22 ENCOUNTER — APPOINTMENT (OUTPATIENT)
Dept: LABOR AND DELIVERY | Age: 25
End: 2020-07-22
Payer: COMMERCIAL

## 2020-07-22 ENCOUNTER — HOSPITAL ENCOUNTER (INPATIENT)
Age: 25
LOS: 1 days | Discharge: HOME | End: 2020-07-23
Attending: OBSTETRICS & GYNECOLOGY | Admitting: OBSTETRICS & GYNECOLOGY
Payer: COMMERCIAL

## 2020-07-22 ENCOUNTER — ANESTHESIA (OUTPATIENT)
Dept: LABOR AND DELIVERY | Age: 25
End: 2020-07-22
Payer: COMMERCIAL

## 2020-07-22 ENCOUNTER — ANESTHESIA EVENT (OUTPATIENT)
Dept: LABOR AND DELIVERY | Age: 25
End: 2020-07-22
Payer: COMMERCIAL

## 2020-07-22 LAB
ABO: NORMAL
AMPHETAMINE+METHAMPHETAMINE URINE SCREEN: NEGATIVE
ANTIBODY SCREEN: NORMAL
BARBITURATE QUANTITATIVE URINE: NEGATIVE
BASOPHILS # BLD: 0.5 %
BASOPHILS ABSOLUTE: 0.1 THOU/MM3 (ref 0–0.1)
BENZODIAZEPINE QUANTITATIVE URINE: NEGATIVE
CANNABINOID QUANTITATIVE URINE: NEGATIVE
COCAINE METABOLITE QUANTITATIVE URINE: NEGATIVE
EOSINOPHIL # BLD: 1.6 %
EOSINOPHILS ABSOLUTE: 0.2 THOU/MM3 (ref 0–0.4)
ERYTHROCYTE [DISTWIDTH] IN BLOOD BY AUTOMATED COUNT: 13.3 % (ref 11.5–14.5)
ERYTHROCYTE [DISTWIDTH] IN BLOOD BY AUTOMATED COUNT: 47.5 FL (ref 35–45)
HCT VFR BLD CALC: 37.7 % (ref 37–47)
HEMOGLOBIN: 12.4 GM/DL (ref 12–16)
IMMATURE GRANS (ABS): 0.06 THOU/MM3 (ref 0–0.07)
IMMATURE GRANULOCYTES: 0.5 %
LYMPHOCYTES # BLD: 24.4 %
LYMPHOCYTES ABSOLUTE: 2.7 THOU/MM3 (ref 1–4.8)
MCH RBC QN AUTO: 32.5 PG (ref 26–33)
MCHC RBC AUTO-ENTMCNC: 32.9 GM/DL (ref 32.2–35.5)
MCV RBC AUTO: 99 FL (ref 81–99)
MONOCYTES # BLD: 8.4 %
MONOCYTES ABSOLUTE: 0.9 THOU/MM3 (ref 0.4–1.3)
NUCLEATED RED BLOOD CELLS: 0 /100 WBC
OPIATES, URINE: NEGATIVE
OXYCODONE: NEGATIVE
PHENCYCLIDINE QUANTITATIVE URINE: NEGATIVE
PLATELET # BLD: 173 THOU/MM3 (ref 130–400)
PMV BLD AUTO: 11.8 FL (ref 9.4–12.4)
RBC # BLD: 3.81 MILL/MM3 (ref 4.2–5.4)
RH FACTOR: NORMAL
RPR: NONREACTIVE
SEG NEUTROPHILS: 64.6 %
SEGMENTED NEUTROPHILS ABSOLUTE COUNT: 7.2 THOU/MM3 (ref 1.8–7.7)
WBC # BLD: 11.1 THOU/MM3 (ref 4.8–10.8)

## 2020-07-22 PROCEDURE — 80307 DRUG TEST PRSMV CHEM ANLYZR: CPT

## 2020-07-22 PROCEDURE — 85025 COMPLETE CBC W/AUTO DIFF WBC: CPT

## 2020-07-22 PROCEDURE — 2500000003 HC RX 250 WO HCPCS: Performed by: ANESTHESIOLOGY

## 2020-07-22 PROCEDURE — 6360000002 HC RX W HCPCS: Performed by: OBSTETRICS & GYNECOLOGY

## 2020-07-22 PROCEDURE — 86901 BLOOD TYPING SEROLOGIC RH(D): CPT

## 2020-07-22 PROCEDURE — 10907ZC DRAINAGE OF AMNIOTIC FLUID, THERAPEUTIC FROM PRODUCTS OF CONCEPTION, VIA NATURAL OR ARTIFICIAL OPENING: ICD-10-PCS | Performed by: OBSTETRICS & GYNECOLOGY

## 2020-07-22 PROCEDURE — 2580000003 HC RX 258: Performed by: OBSTETRICS & GYNECOLOGY

## 2020-07-22 PROCEDURE — 86850 RBC ANTIBODY SCREEN: CPT

## 2020-07-22 PROCEDURE — 6360000002 HC RX W HCPCS: Performed by: ANESTHESIOLOGY

## 2020-07-22 PROCEDURE — 2709999900 HC NON-CHARGEABLE SUPPLY

## 2020-07-22 PROCEDURE — 6360000002 HC RX W HCPCS

## 2020-07-22 PROCEDURE — 36415 COLL VENOUS BLD VENIPUNCTURE: CPT

## 2020-07-22 PROCEDURE — 6370000000 HC RX 637 (ALT 250 FOR IP): Performed by: OBSTETRICS & GYNECOLOGY

## 2020-07-22 PROCEDURE — 1200000000 HC SEMI PRIVATE

## 2020-07-22 PROCEDURE — 86900 BLOOD TYPING SEROLOGIC ABO: CPT

## 2020-07-22 PROCEDURE — 3E033VJ INTRODUCTION OF OTHER HORMONE INTO PERIPHERAL VEIN, PERCUTANEOUS APPROACH: ICD-10-PCS | Performed by: OBSTETRICS & GYNECOLOGY

## 2020-07-22 PROCEDURE — 86592 SYPHILIS TEST NON-TREP QUAL: CPT

## 2020-07-22 PROCEDURE — 7200000001 HC VAGINAL DELIVERY

## 2020-07-22 PROCEDURE — 3700000025 EPIDURAL BLOCK: Performed by: ANESTHESIOLOGY

## 2020-07-22 RX ORDER — NALOXONE HYDROCHLORIDE 0.4 MG/ML
0.4 INJECTION, SOLUTION INTRAMUSCULAR; INTRAVENOUS; SUBCUTANEOUS PRN
Status: DISCONTINUED | OUTPATIENT
Start: 2020-07-22 | End: 2020-07-22 | Stop reason: HOSPADM

## 2020-07-22 RX ORDER — ONDANSETRON 2 MG/ML
4 INJECTION INTRAMUSCULAR; INTRAVENOUS EVERY 6 HOURS PRN
Status: DISCONTINUED | OUTPATIENT
Start: 2020-07-22 | End: 2020-07-23 | Stop reason: HOSPADM

## 2020-07-22 RX ORDER — MORPHINE SULFATE 4 MG/ML
4 INJECTION, SOLUTION INTRAMUSCULAR; INTRAVENOUS
Status: DISCONTINUED | OUTPATIENT
Start: 2020-07-22 | End: 2020-07-23 | Stop reason: HOSPADM

## 2020-07-22 RX ORDER — SODIUM CHLORIDE, SODIUM LACTATE, POTASSIUM CHLORIDE, CALCIUM CHLORIDE 600; 310; 30; 20 MG/100ML; MG/100ML; MG/100ML; MG/100ML
INJECTION, SOLUTION INTRAVENOUS CONTINUOUS
Status: DISCONTINUED | OUTPATIENT
Start: 2020-07-22 | End: 2020-07-23 | Stop reason: HOSPADM

## 2020-07-22 RX ORDER — SEVOFLURANE 250 ML/250ML
1 LIQUID RESPIRATORY (INHALATION) CONTINUOUS PRN
Status: DISCONTINUED | OUTPATIENT
Start: 2020-07-22 | End: 2020-07-22 | Stop reason: HOSPADM

## 2020-07-22 RX ORDER — METHYLERGONOVINE MALEATE 0.2 MG/ML
200 INJECTION INTRAVENOUS PRN
Status: DISCONTINUED | OUTPATIENT
Start: 2020-07-22 | End: 2020-07-22 | Stop reason: HOSPADM

## 2020-07-22 RX ORDER — METHYLERGONOVINE MALEATE 0.2 MG/ML
200 INJECTION INTRAVENOUS
Status: ACTIVE | OUTPATIENT
Start: 2020-07-22 | End: 2020-07-22

## 2020-07-22 RX ORDER — FENTANYL CITRATE 50 UG/ML
INJECTION, SOLUTION INTRAMUSCULAR; INTRAVENOUS PRN
Status: DISCONTINUED | OUTPATIENT
Start: 2020-07-22 | End: 2020-07-22 | Stop reason: SDUPTHER

## 2020-07-22 RX ORDER — DOCUSATE SODIUM 100 MG/1
100 CAPSULE, LIQUID FILLED ORAL 2 TIMES DAILY PRN
Status: DISCONTINUED | OUTPATIENT
Start: 2020-07-22 | End: 2020-07-23 | Stop reason: HOSPADM

## 2020-07-22 RX ORDER — SODIUM CHLORIDE 0.9 % (FLUSH) 0.9 %
10 SYRINGE (ML) INJECTION EVERY 12 HOURS SCHEDULED
Status: DISCONTINUED | OUTPATIENT
Start: 2020-07-22 | End: 2020-07-23

## 2020-07-22 RX ORDER — ROPIVACAINE HYDROCHLORIDE 2 MG/ML
INJECTION, SOLUTION EPIDURAL; INFILTRATION; PERINEURAL
Status: COMPLETED
Start: 2020-07-22 | End: 2020-07-22

## 2020-07-22 RX ORDER — IBUPROFEN 800 MG/1
800 TABLET ORAL 3 TIMES DAILY
Status: DISCONTINUED | OUTPATIENT
Start: 2020-07-22 | End: 2020-07-23 | Stop reason: HOSPADM

## 2020-07-22 RX ORDER — ACETAMINOPHEN 325 MG/1
650 TABLET ORAL EVERY 4 HOURS PRN
Status: DISCONTINUED | OUTPATIENT
Start: 2020-07-22 | End: 2020-07-22 | Stop reason: HOSPADM

## 2020-07-22 RX ORDER — CARBOPROST TROMETHAMINE 250 UG/ML
250 INJECTION, SOLUTION INTRAMUSCULAR
Status: DISCONTINUED | OUTPATIENT
Start: 2020-07-22 | End: 2020-07-22 | Stop reason: SDUPTHER

## 2020-07-22 RX ORDER — ONDANSETRON 4 MG/1
8 TABLET, ORALLY DISINTEGRATING ORAL EVERY 8 HOURS PRN
Status: DISCONTINUED | OUTPATIENT
Start: 2020-07-22 | End: 2020-07-23 | Stop reason: HOSPADM

## 2020-07-22 RX ORDER — SODIUM CHLORIDE 0.9 % (FLUSH) 0.9 %
10 SYRINGE (ML) INJECTION PRN
Status: DISCONTINUED | OUTPATIENT
Start: 2020-07-22 | End: 2020-07-23

## 2020-07-22 RX ORDER — ONDANSETRON 2 MG/ML
4 INJECTION INTRAMUSCULAR; INTRAVENOUS EVERY 6 HOURS PRN
Status: DISCONTINUED | OUTPATIENT
Start: 2020-07-22 | End: 2020-07-22 | Stop reason: HOSPADM

## 2020-07-22 RX ORDER — TERBUTALINE SULFATE 1 MG/ML
0.25 INJECTION, SOLUTION SUBCUTANEOUS
Status: DISCONTINUED | OUTPATIENT
Start: 2020-07-22 | End: 2020-07-22 | Stop reason: HOSPADM

## 2020-07-22 RX ORDER — IBUPROFEN 800 MG/1
800 TABLET ORAL EVERY 8 HOURS PRN
Status: DISCONTINUED | OUTPATIENT
Start: 2020-07-22 | End: 2020-07-22 | Stop reason: HOSPADM

## 2020-07-22 RX ORDER — FENTANYL CITRATE 50 UG/ML
INJECTION, SOLUTION INTRAMUSCULAR; INTRAVENOUS
Status: DISCONTINUED
Start: 2020-07-22 | End: 2020-07-22

## 2020-07-22 RX ORDER — LIDOCAINE HYDROCHLORIDE 10 MG/ML
30 INJECTION, SOLUTION EPIDURAL; INFILTRATION; INTRACAUDAL; PERINEURAL PRN
Status: DISCONTINUED | OUTPATIENT
Start: 2020-07-22 | End: 2020-07-22 | Stop reason: HOSPADM

## 2020-07-22 RX ORDER — MISOPROSTOL 200 UG/1
800 TABLET ORAL
Status: ACTIVE | OUTPATIENT
Start: 2020-07-22 | End: 2020-07-22

## 2020-07-22 RX ORDER — ONDANSETRON 2 MG/ML
8 INJECTION INTRAMUSCULAR; INTRAVENOUS EVERY 6 HOURS PRN
Status: DISCONTINUED | OUTPATIENT
Start: 2020-07-22 | End: 2020-07-22 | Stop reason: HOSPADM

## 2020-07-22 RX ORDER — MORPHINE SULFATE 4 MG/ML
4 INJECTION, SOLUTION INTRAMUSCULAR; INTRAVENOUS
Status: DISCONTINUED | OUTPATIENT
Start: 2020-07-22 | End: 2020-07-22 | Stop reason: HOSPADM

## 2020-07-22 RX ORDER — MORPHINE SULFATE 2 MG/ML
2 INJECTION, SOLUTION INTRAMUSCULAR; INTRAVENOUS
Status: DISCONTINUED | OUTPATIENT
Start: 2020-07-22 | End: 2020-07-22 | Stop reason: HOSPADM

## 2020-07-22 RX ORDER — CARBOPROST TROMETHAMINE 250 UG/ML
250 INJECTION, SOLUTION INTRAMUSCULAR PRN
Status: DISCONTINUED | OUTPATIENT
Start: 2020-07-22 | End: 2020-07-23 | Stop reason: HOSPADM

## 2020-07-22 RX ORDER — BUTORPHANOL TARTRATE 1 MG/ML
1 INJECTION, SOLUTION INTRAMUSCULAR; INTRAVENOUS
Status: DISCONTINUED | OUTPATIENT
Start: 2020-07-22 | End: 2020-07-22 | Stop reason: HOSPADM

## 2020-07-22 RX ORDER — MISOPROSTOL 200 UG/1
1000 TABLET ORAL PRN
Status: DISCONTINUED | OUTPATIENT
Start: 2020-07-22 | End: 2020-07-22 | Stop reason: HOSPADM

## 2020-07-22 RX ORDER — NALBUPHINE HCL 10 MG/ML
5 AMPUL (ML) INJECTION EVERY 4 HOURS PRN
Status: DISCONTINUED | OUTPATIENT
Start: 2020-07-22 | End: 2020-07-22 | Stop reason: HOSPADM

## 2020-07-22 RX ORDER — SODIUM CHLORIDE, SODIUM LACTATE, POTASSIUM CHLORIDE, CALCIUM CHLORIDE 600; 310; 30; 20 MG/100ML; MG/100ML; MG/100ML; MG/100ML
INJECTION, SOLUTION INTRAVENOUS CONTINUOUS
Status: DISCONTINUED | OUTPATIENT
Start: 2020-07-22 | End: 2020-07-22

## 2020-07-22 RX ORDER — HYDROCODONE BITARTRATE AND ACETAMINOPHEN 5; 325 MG/1; MG/1
1 TABLET ORAL EVERY 4 HOURS PRN
Status: DISCONTINUED | OUTPATIENT
Start: 2020-07-22 | End: 2020-07-23 | Stop reason: HOSPADM

## 2020-07-22 RX ORDER — MODIFIED LANOLIN
OINTMENT (GRAM) TOPICAL PRN
Status: DISCONTINUED | OUTPATIENT
Start: 2020-07-22 | End: 2020-07-23 | Stop reason: HOSPADM

## 2020-07-22 RX ORDER — ROPIVACAINE HYDROCHLORIDE 2 MG/ML
INJECTION, SOLUTION EPIDURAL; INFILTRATION; PERINEURAL PRN
Status: DISCONTINUED | OUTPATIENT
Start: 2020-07-22 | End: 2020-07-22 | Stop reason: SDUPTHER

## 2020-07-22 RX ORDER — FERROUS SULFATE 325(65) MG
325 TABLET ORAL
Status: DISCONTINUED | OUTPATIENT
Start: 2020-07-23 | End: 2020-07-23 | Stop reason: HOSPADM

## 2020-07-22 RX ORDER — ACETAMINOPHEN 325 MG/1
650 TABLET ORAL EVERY 4 HOURS PRN
Status: DISCONTINUED | OUTPATIENT
Start: 2020-07-22 | End: 2020-07-23 | Stop reason: HOSPADM

## 2020-07-22 RX ORDER — HYDROCODONE BITARTRATE AND ACETAMINOPHEN 5; 325 MG/1; MG/1
2 TABLET ORAL EVERY 4 HOURS PRN
Status: DISCONTINUED | OUTPATIENT
Start: 2020-07-22 | End: 2020-07-23 | Stop reason: HOSPADM

## 2020-07-22 RX ORDER — DIPHENHYDRAMINE HYDROCHLORIDE 50 MG/ML
25 INJECTION INTRAMUSCULAR; INTRAVENOUS EVERY 4 HOURS PRN
Status: DISCONTINUED | OUTPATIENT
Start: 2020-07-22 | End: 2020-07-22 | Stop reason: HOSPADM

## 2020-07-22 RX ORDER — MORPHINE SULFATE 2 MG/ML
2 INJECTION, SOLUTION INTRAMUSCULAR; INTRAVENOUS
Status: DISCONTINUED | OUTPATIENT
Start: 2020-07-22 | End: 2020-07-23 | Stop reason: HOSPADM

## 2020-07-22 RX ADMIN — IBUPROFEN 800 MG: 800 TABLET, FILM COATED ORAL at 16:59

## 2020-07-22 RX ADMIN — Medication 16 ML/HR: at 13:25

## 2020-07-22 RX ADMIN — FENTANYL CITRATE 100 MCG: 50 INJECTION, SOLUTION INTRAMUSCULAR; INTRAVENOUS at 14:00

## 2020-07-22 RX ADMIN — BUTORPHANOL TARTRATE 1 MG: 1 INJECTION, SOLUTION INTRAMUSCULAR; INTRAVENOUS at 10:49

## 2020-07-22 RX ADMIN — DOCUSATE SODIUM 100 MG: 100 CAPSULE, LIQUID FILLED ORAL at 20:22

## 2020-07-22 RX ADMIN — SODIUM CHLORIDE, POTASSIUM CHLORIDE, SODIUM LACTATE AND CALCIUM CHLORIDE: 600; 310; 30; 20 INJECTION, SOLUTION INTRAVENOUS at 16:09

## 2020-07-22 RX ADMIN — BUTORPHANOL TARTRATE 1 MG: 1 INJECTION, SOLUTION INTRAMUSCULAR; INTRAVENOUS at 12:02

## 2020-07-22 RX ADMIN — SODIUM CHLORIDE, POTASSIUM CHLORIDE, SODIUM LACTATE AND CALCIUM CHLORIDE: 600; 310; 30; 20 INJECTION, SOLUTION INTRAVENOUS at 05:42

## 2020-07-22 RX ADMIN — ACETAMINOPHEN 650 MG: 325 TABLET ORAL at 20:24

## 2020-07-22 RX ADMIN — Medication 1 MILLI-UNITS/MIN: at 06:21

## 2020-07-22 RX ADMIN — ROPIVACAINE HYDROCHLORIDE 16 MG: 2 INJECTION, SOLUTION EPIDURAL; INFILTRATION at 14:00

## 2020-07-22 RX ADMIN — SODIUM CHLORIDE, POTASSIUM CHLORIDE, SODIUM LACTATE AND CALCIUM CHLORIDE: 600; 310; 30; 20 INJECTION, SOLUTION INTRAVENOUS at 11:21

## 2020-07-22 ASSESSMENT — PAIN SCALES - GENERAL
PAINLEVEL_OUTOF10: 6
PAINLEVEL_OUTOF10: 1
PAINLEVEL_OUTOF10: 7
PAINLEVEL_OUTOF10: 0
PAINLEVEL_OUTOF10: 5

## 2020-07-22 ASSESSMENT — PAIN DESCRIPTION - DESCRIPTORS
DESCRIPTORS: OTHER (COMMENT)
DESCRIPTORS: CRAMPING
DESCRIPTORS: OTHER (COMMENT)

## 2020-07-22 NOTE — FLOWSHEET NOTE
Patient arrived to L&D for scheduled induction for IUGR. Patient reports good fetal movement, denies any vaginal bleeding or leaking of fluids. Patient changed into gown and oriented to room. Patient to bathroom to void, informed of maternal drug testing policy in place on all laboring patients. Verbal consent received, paper consent to be signed and urine to be sent. Explained patients right to have family, representative or physician notified of their admission. Patient has Declined for physician to be notified. Patient has Declined for family/representative to be notified.

## 2020-07-22 NOTE — PLAN OF CARE
Problem: Discharge Planning:  Goal: Discharged to appropriate level of care  Description: Discharged to appropriate level of care  Outcome: Ongoing  Note: On the Maternity Unit for care and medication     Problem: Constipation:  Goal: Bowel elimination is within specified parameters  Description: Bowel elimination is within specified parameters  Outcome: Ongoing  Note: No gas, Colace  to start this PM     Problem: Fluid Volume - Imbalance:  Goal: Absence of postpartum hemorrhage signs and symptoms  Description: Absence of postpartum hemorrhage signs and symptoms  Outcome: Ongoing  Note: Moderate amount of red lochia fundus is firm and centered     Problem: Infection - Risk of, Puerperal Infection:  Goal: Will show no infection signs and symptoms  Description: Will show no infection signs and symptoms  Outcome: Ongoing  Note: V/S NWL< no untoward s/s reported     Problem: Mood - Altered:  Goal: Mood stable  Description: Mood stable  Outcome: Ongoing  Note: Pleasant     Problem: Pain - Acute:  Goal: Pain level will decrease  Description: Pain level will decrease  Outcome: Ongoing  Note: Denies pain,  pain goal is \"5\"   Care plan reviewed with patient and  verbalized understanding. Patient contributed to goal setting.

## 2020-07-22 NOTE — PLAN OF CARE
Problem: Anxiety:  Goal: Level of anxiety will decrease  Description: Level of anxiety will decrease  7/22/2020 1626 by Christiane Browne RN  Outcome: Ongoing  Note: Denies anxiety and encouraged to ask questions and plan of care discussed. 7/22/2020 0719 by Paolo Ayala RN  Outcome: Ongoing  Note: Pt remains calm about the birthing experience,  at bedside, supportive. All questions/concerns addressed by RN.    7/22/2020 1039 by Yon Olivier RN  Outcome: Ongoing  Note: Patient is calm and cooperative, spouse at bedside and supportive      Problem: Breathing Pattern - Ineffective:  Goal: Able to breathe comfortably  Description: Able to breathe comfortably  7/22/2020 1626 by Christiane Browne RN  Outcome: Ongoing  7/22/2020 0719 by Paolo Ayala RN  Outcome: Ongoing  Note: No signs of resp distress noted. Sp02 remains greater than 92% on room air. Respirations equal and unlabored. 7/22/2020 9453 by Yon Olivier RN  Outcome: Ongoing  Note: Easy and unlabored respirations, denies sob      Problem: Fluid Volume - Imbalance:  Goal: Absence of intrapartum hemorrhage signs and symptoms  Description: Absence of intrapartum hemorrhage signs and symptoms  7/22/2020 1626 by Christiane Browne RN  Outcome: Ongoing  Note: Normal vaginal leaking of fluid in labor and no vaginal hemorrhage. 7/22/2020 0719 by Paolo Ayala RN  Outcome: Ongoing  Note: No vaginal bleeding noted, will continue to monitor. 7/22/2020 2780 by Yon Olivier RN  Outcome: Ongoing  Note: No vaginal bleeding noted, will continue to monitor      Problem: Infection - Intrapartum Infection:  Goal: Will show no infection signs and symptoms  Description: Will show no infection signs and symptoms  7/22/2020 1626 by Christiane Browne RN  Outcome: Ongoing  Note: Afebrile and no odor to amniotic fluid leaking vaginally. Will continue to monitor.   7/22/2020 0719 by Paolo Ayala RN  Outcome: Ongoing  Note: Vitals stable, pt remains afebrile. GBS negative. FHT's remain reassuring, will continue to monitor. 7/22/2020 0648 by Matilda Gardner RN  Outcome: Ongoing  Note: Vitals are stable, gbs negative      Problem: Labor Process - Prolonged:  Goal: Uterine contractions within specified parameters  Description: Uterine contractions within specified parameters  7/22/2020 1626 by Melina Wang RN  Outcome: Ongoing  Note: Uterine contractions are adequate to achieve delivery at 1559.  7/22/2020 0719 by Nano Gautam RN  Outcome: Ongoing  Note: Pt ambulating to bathroom as needed. 7/22/2020 0648 by Matilda Gardner RN  Outcome: Ongoing  Note: [place] monitor in place tracing contractions      Problem: Pain - Acute:  Goal: Able to cope with pain  Description: Able to cope with pain  7/22/2020 1626 by Melina Wang RN  Outcome: Ongoing  Note: Denies pain after epidural started working with Dr. Grace Meter adjustments made and verbalizes like it feels like it is working well.  remains supportive at side. Pain goal is 6.  7/22/2020 0719 by Nano Gautam RN  Outcome: Ongoing  Note: Pt comfortable at this time, unsure of her plans for pain relief at this time. Pain goal: 6/10.   7/22/2020 6004 by Matilda Gardner RN  Outcome: Ongoing  Note: Denies currently, pain goal stated 6/10     Problem: Tissue Perfusion - Uteroplacental, Altered:  Goal: Absence of abnormal fetal heart rate pattern  Description: Absence of abnormal fetal heart rate pattern  7/22/2020 1626 by Melina Wang RN  Outcome: Ongoing  Note: Reassuring fetal tracing.  7/22/2020 0719 by Nano Gautam RN  Outcome: Ongoing  Note: Fetal Heart Tones remain reassuring. Continuous EFM in place.      7/22/2020 0648 by Matilda Gardner RN  Outcome: Ongoing  Note: External us in place tracing reactive fhts      Problem: Urinary Retention:  Goal: Urinary elimination within specified parameters  Description: Urinary elimination within specified parameters  7/22/2020 1626 by Melina Wang RN  Outcome: Ongoing  Note: Voiding well when up to the bathroom and then voided on bedpan once. 7/22/2020 0719 by Anna Vincent RN  Outcome: Ongoing  Note: Pt ambulating to bathroom as needed. 7/22/2020 0648 by Noa Thurston RN  Outcome: Ongoing  Note: Voiding with ease, quantities sufficient      Problem: Falls - Risk of:  Goal: Will remain free from falls  Description: Will remain free from falls  7/22/2020 1626 by Beena Galvan RN  Outcome: Ongoing  Note: Bed is locked and call light is in place and instructed on bedrest and verbalizes understanding.  7/22/2020 0719 by Anna Vincent RN  Outcome: Ongoing  Note: Pt. Remains free from falls at this time. IV infusing per order. RN encouraged pt. To call for assistance to BR. Side rails up X2. Call light within reach. S.O. At bedside. RN will continue to provide for a safe environment. 7/22/2020 0648 by Noa Thurston RN  Outcome: Ongoing  Note: Aware to call out for assistance with ambulation, bed locked in lowest position, call light within reach      Problem: Discharge Planning:  Goal: Discharged to appropriate level of care  Description: Discharged to appropriate level of care  7/22/2020 1626 by Beena Galvan RN  Outcome: Ongoing  Note: Plans to go home one to two days after delivery. 7/22/2020 0719 by Anna Vincent RN  Outcome: Ongoing  Note: Pt aware of 2hr recovery period in L&D and then transfer to mom/baby for the remainder of her stay. 7/22/2020 0648 by Noa Thurston RN  Outcome: Ongoing  Note: Aware of 2 hour recovery period on L&D then transfer to mom/baby unit for remainder of stay    Care plan reviewed with patient and Shravan Marks and they verbalize understanding of the plan of care and contribute to goal setting.

## 2020-07-22 NOTE — ANESTHESIA PROCEDURE NOTES
Epidural Block    Patient location during procedure: OB  Reason for block: labor epidural  Staffing  Anesthesiologist: Froilan Larson MD  Performed: anesthesiologist   Preanesthetic Checklist  Completed: patient identified, site marked, surgical consent, pre-op evaluation, timeout performed, IV checked, risks and benefits discussed, monitors and equipment checked, anesthesia consent given, oxygen available and patient being monitored  Epidural  Patient position: sitting  Prep: ChloraPrep  Patient monitoring: continuous pulse ox and frequent blood pressure checks  Approach: midline  Location: lumbar (1-5)  Injection technique: REMI saline  Provider prep: mask and sterile gloves  Needle  Needle type: Tuohy   Needle gauge: 18 G  Needle length: 3.5 in  Needle insertion depth: 5 cm  Catheter type: side hole  Catheter at skin depth: 10 cm  Test dose: negative  Assessment  Hemodynamics: stable  Attempts: 2  Additional Notes  Upon threading catheter with 1st attempt, blood return was seen. Catheter removed. 2nd attempt at same level without complication.

## 2020-07-22 NOTE — PLAN OF CARE
Problem: Anxiety:  Goal: Level of anxiety will decrease  Description: Level of anxiety will decrease  Outcome: Ongoing  Note: Patient is calm and cooperative, spouse at bedside and supportive      Problem: Breathing Pattern - Ineffective:  Goal: Able to breathe comfortably  Description: Able to breathe comfortably  Outcome: Ongoing  Note: Easy and unlabored respirations, denies sob      Problem: Fluid Volume - Imbalance:  Goal: Absence of intrapartum hemorrhage signs and symptoms  Description: Absence of intrapartum hemorrhage signs and symptoms  Outcome: Ongoing  Note: No vaginal bleeding noted, will continue to monitor      Problem: Infection - Intrapartum Infection:  Goal: Will show no infection signs and symptoms  Description: Will show no infection signs and symptoms  Outcome: Ongoing  Note: Vitals are stable, gbs negative      Problem: Labor Process - Prolonged:  Goal: Uterine contractions within specified parameters  Description: Uterine contractions within specified parameters  Outcome: Ongoing  Note: [place] monitor in place tracing contractions      Problem: Pain - Acute:  Goal: Able to cope with pain  Description: Able to cope with pain  Outcome: Ongoing  Note: Denies currently, pain goal stated 6/10     Problem: Tissue Perfusion - Uteroplacental, Altered:  Goal: Absence of abnormal fetal heart rate pattern  Description: Absence of abnormal fetal heart rate pattern  Outcome: Ongoing  Note: External us in place tracing reactive fhts      Problem: Urinary Retention:  Goal: Urinary elimination within specified parameters  Description: Urinary elimination within specified parameters  Outcome: Ongoing  Note: Voiding with ease, quantities sufficient      Problem: Falls - Risk of:  Goal: Will remain free from falls  Description: Will remain free from falls  Outcome: Ongoing  Note: Aware to call out for assistance with ambulation, bed locked in lowest position, call light within reach      Problem: Discharge Planning:  Goal: Discharged to appropriate level of care  Description: Discharged to appropriate level of care  Outcome: Ongoing  Note: Aware of 2 hour recovery period on L&D then transfer to mom/baby unit for remainder of stay     Care plan reviewed with patient and spouse. Patient and spouse verbalize understanding of the plan of care and contribute to goal setting.

## 2020-07-22 NOTE — FLOWSHEET NOTE
In from L/D by wheelchair with an IV of LR plain 1000 ml with @ 620 ml to count at125 ml / hr, with a side IV of  ml + 30 units Pitocin with @ 100 ml at 50 ml/ hr. Inf[facility] taken to the [facility] per Mom's request. Oriented to the room, post partum teaching started. Due to void x 2 instructed.  Denies need at this time

## 2020-07-22 NOTE — H&P
[address] [address]  History and Physical Update    Pt Name: Audie Ospina  MRN: [id number]  YOB: 1995  Date of evaluation: 2020    [] I have examined the patient and reviewed the H&P/Consult and there are no changes to the patient or plans. [x] I have examined the patient and reviewed the H&P/Consult and have noted the following changes:   21 yo  @ 38+2 wks presents for IOL for suspected fetal growth abnormality. Sve: /-2, vtx, AROM for scant clear fluid. FHTs: 125, mod nadya, +accels, no decels toco: q3-4 min. Category I tracing. con't to work towards delivery. GBS negative    Discussion with the patient and/ or family for proposed care, treatment, services; benefits, risks, side effects; likelihood of achieving goals and potential problems that may occur during recuperation was had and all questions were answered. Discussion with the patient and/ or family of reasonable alternatives to the proposed care, treatment, services and the discussion of the risks, benefits, side effects related to the alternatives and the risk related to not receiving the proposed care treatment services was also had and all questions were answered. If this is for an elective surgical procedure then The patient was counseled at length about the risks of olivia Covid-19 during their perioperative period and any recovery window from their procedure. The patient was made aware that olivia Covid-19  may worsen their prognosis for recovering from their procedure  and lend to a higher morbidity and/or mortality risk. All material risks, benefits, and reasonable alternatives including postponing the procedure were discussed. The patient  does wish to proceed with the procedure at this time.              Dan Cranker Rumschlag,MD  Electronically signed 2020 at 4:12 PM

## 2020-07-22 NOTE — PROGRESS NOTES
Called for delivery. On arrival at bedside. Pt complete and +3. Instructed on pushing techniques. FHTs: 125, mod nadya, +accels, no decels toco: q2-3 min. Category I tracing. Anticipate .      Kennedy Nolasco  4:08 PM  2020

## 2020-07-22 NOTE — PLAN OF CARE
Problem: Anxiety:  Goal: Level of anxiety will decrease  Description: Level of anxiety will decrease  7/22/2020 0719 by Nirav Hubbard RN  Outcome: Ongoing  Note: Pt remains calm about the birthing experience,  at bedside, supportive. All questions/concerns addressed by RN.    7/22/2020 8220 by Prudencio Fernandez RN  Outcome: Ongoing  Note: Patient is calm and cooperative, spouse at bedside and supportive      Problem: Breathing Pattern - Ineffective:  Goal: Able to breathe comfortably  Description: Able to breathe comfortably  7/22/2020 0719 by Nirav Hubbard RN  Outcome: Ongoing  Note: No signs of resp distress noted. Sp02 remains greater than 92% on room air. Respirations equal and unlabored. 7/22/2020 0648 by Prudencio Fernandez RN  Outcome: Ongoing  Note: Easy and unlabored respirations, denies sob      Problem: Fluid Volume - Imbalance:  Goal: Absence of intrapartum hemorrhage signs and symptoms  Description: Absence of intrapartum hemorrhage signs and symptoms  7/22/2020 0719 by Nirav Hubbard RN  Outcome: Ongoing  Note: No vaginal bleeding noted, will continue to monitor. 7/22/2020 0648 by Prudencio Fernandez RN  Outcome: Ongoing  Note: No vaginal bleeding noted, will continue to monitor      Problem: Infection - Intrapartum Infection:  Goal: Will show no infection signs and symptoms  Description: Will show no infection signs and symptoms  7/22/2020 0719 by Nirav Hubbard RN  Outcome: Ongoing  Note: Vitals stable, pt remains afebrile. GBS negative. FHT's remain reassuring, will continue to monitor. 7/22/2020 0648 by Prudencio Fernandez RN  Outcome: Ongoing  Note: Vitals are stable, gbs negative      Problem: Labor Process - Prolonged:  Goal: Uterine contractions within specified parameters  Description: Uterine contractions within specified parameters  7/22/2020 0719 by Nirav Hubbard RN  Outcome: Ongoing  Note: Pt ambulating to bathroom as needed.    7/22/2020 9043 by Prudencio Fernandez RN  Outcome: Ongoing  Note: [place] monitor in place tracing contractions      Problem: Pain - Acute:  Goal: Able to cope with pain  Description: Able to cope with pain  7/22/2020 0719 by Horace Marshall RN  Outcome: Ongoing  Note: Pt comfortable at this time, unsure of her plans for pain relief at this time. Pain goal: 6/10.   7/22/2020 4682 by Savana Armstrong RN  Outcome: Ongoing  Note: Denies currently, pain goal stated 6/10     Problem: Tissue Perfusion - Uteroplacental, Altered:  Goal: Absence of abnormal fetal heart rate pattern  Description: Absence of abnormal fetal heart rate pattern  7/22/2020 0719 by Horace Marshall RN  Outcome: Ongoing  Note: Fetal Heart Tones remain reassuring. Continuous EFM in place. 7/22/2020 0648 by Savana Armstrong RN  Outcome: Ongoing  Note: External us in place tracing reactive fhts      Problem: Urinary Retention:  Goal: Urinary elimination within specified parameters  Description: Urinary elimination within specified parameters  7/22/2020 0719 by Horace Marshall RN  Outcome: Ongoing  Note: Pt ambulating to bathroom as needed. 7/22/2020 0648 by Savana Armstrong RN  Outcome: Ongoing  Note: Voiding with ease, quantities sufficient      Problem: Falls - Risk of:  Goal: Will remain free from falls  Description: Will remain free from falls  7/22/2020 0719 by Horace Marshall RN  Outcome: Ongoing  Note: Pt. Remains free from falls at this time. IV infusing per order. RN encouraged pt. To call for assistance to BR. Side rails up X2. Call light within reach. S.O. At bedside. RN will continue to provide for a safe environment.     7/22/2020 0648 by Savana Armstrong RN  Outcome: Ongoing  Note: Aware to call out for assistance with ambulation, bed locked in lowest position, call light within reach      Problem: Discharge Planning:  Goal: Discharged to appropriate level of care  Description: Discharged to appropriate level of care  7/22/2020 0719 by Horace Marshall RN  Outcome: Ongoing  Note: Pt aware of 2hr recovery period in L&D and then transfer to mom/baby for the remainder of her stay. 7/22/2020 0648 by Ashwin Farias RN  Outcome: Ongoing  Note: Aware of 2 hour recovery period on L&D then transfer to mom/baby unit for remainder of stay    Care plan reviewed with patient and her , Michael Asif. Patient and Michael Enter verbalize understanding of the plan of care and contribute to goal setting.

## 2020-07-22 NOTE — L&D DELIVERY NOTE
Department of Obstetrics and Gynecology   Vaginal Delivery Note at [facility]      Date of Delivery:  2020    Procedure:  Spontaneous vaginal delivery    Surgeon:  Jenni Oliveros    Anesthesia:  epidural anesthesia    Estimated blood loss:  350ml    Cord blood sent Yes    Complications:  IUGR    Condition:  infant stable to general nursery and mother stable    Details of Procedure: The patient is a 22 y.o.  female at 38w3d  who was admitted for IOL for suspected fetal growth abnormality. She received the following interventions: ARBOW and IV Pitocin induction. The patient progressed well,did receive an epidural, became complete and started to push. Patient progressed well and the fetal head was delivered over an intact perineum, and the rest of the infant delivered atraumatically, placed on mother abdomen. The cord was clamped and cut after 1 minute and infant placed skin to skin with  the waiting nurse at bedside for evaluation. Cord segment and cord blood collected. The delivery of the placenta was spontaneous. The perineum and vagina were explored and no lacerations required repair. A vaginal sweep was completed and one sharp was placed in the proper container. Sponge count correct.     Infant Wt: pending    APGARS:  pending   Gerard Nascimento [[id number]]             Electronically signed by Kennedy Nolasco MD on 2020 at 4:08 PM

## 2020-07-22 NOTE — ANESTHESIA PRE PROCEDURE
Department of Anesthesiology  Preprocedure Note       Name:  Elan Rock   Age:  22 y.o.  :  1995                                          MRN:  [id number]         Date:  2020      Surgeon: * No surgeons listed *    Procedure: * No procedures listed *    Medications prior to admission:   Prior to Admission medications    Medication Sig Start Date End Date Taking?  Authorizing Provider   Prenatal Vit w/Bd-Alexymhbw-JE (PNV PO) Take by mouth   Yes Historical Provider, MD       Current medications:    Current Facility-Administered Medications   Medication Dose Route Frequency Provider Last Rate Last Dose    lactated ringers infusion   Intravenous Continuous Emory Tirado  mL/hr at 20 1121      lidocaine PF 1 % injection 30 mL  30 mL Other PRN Emory Tirado MD        butorphanol (STADOL) injection 1 mg  1 mg Intravenous Q1H PRN Emoyr Tirado MD   1 mg at 20 1202    nitrous oxide 50% inhalation 1 each  1 each Inhalation Continuous PRN Emory Tirado MD        acetaminophen (TYLENOL) tablet 650 mg  650 mg Oral Q4H PRN Emory Tirado MD        ibuprofen (ADVIL;MOTRIN) tablet 800 mg  800 mg Oral Q8H PRN Emory Tirado MD        morphine (PF) injection 2 mg  2 mg Intravenous Q2H PRN mEory Tirado MD        Or    morphine injection 4 mg  4 mg Intravenous Q2H PRN Emory Tirado MD        oxytocin (PITOCIN) 30 units in 500 mL infusion  1 alexandre-units/min Intravenous Continuous Emory Tirado MD 8 mL/hr at 20 0912 8 alexandre-units/min at 20 0912    diphenhydrAMINE (BENADRYL) injection 25 mg  25 mg Intravenous Q4H PRN Emory Tirado MD        [facility]etron [facility] injection 8 mg  8 mg Intravenous Q6H PRN Emory Tirado MD        terbutaline (BRETHINE) injection 0.25 mg  0.25 mg Subcutaneous Once PRN Emory Tirado MD        oxytocin (PITOCIN) 30 units in 500 mL infusion  1 alexandre-units/min Intravenous Continuous PRN Saud Church MD        methylergonovine (METHERGINE) injection 200 mcg  200 mcg Intramuscular PRN Saud Church MD        miSOPROStol (CYTOTEC) tablet 1,000 mcg  1,000 mcg Rectal PRN Saud Church MD        witch hazel-glycerin (TUCKS) pad   Topical PRN Saud Church MD        benzocaine-menthol (DERMOPLAST) 20-0.5 % spray   Topical PRN Saud Church MD           Allergies: Allergies   Allergen Reactions    Amoxicillin-Pot Clavulanate Nausea Only    Codeine Nausea Only    Penicillins Nausea And Vomiting       Problem List:    Patient Active Problem List   Diagnosis Code    Nasal fracture S02. 2XXA    Neck pain M54.2    Nasal trauma S09. 92XA    Normal delivery O80    Spinal headache complicating labor and delivery, delivered O74.5       Past Medical History:        Diagnosis Date    Asthma     sports induced asthma       Past Surgical History:        Procedure Laterality Date    CHOLECYSTECTOMY      MANIP OF FRACT. NASAL BONE SEPTUM AND SPLINT STABILIZATN  1/15/13    [facility]-Dr. Majel Halsted    WISDOM TOOTH EXTRACTION         Social History:    Social History     Tobacco Use    Smoking status: Never Smoker    Smokeless tobacco: Never Used   Substance Use Topics    Alcohol use:  No                                Counseling given: Not Answered      Vital Signs (Current):   Vitals:    07/22/20 1140 07/22/20 1203 07/22/20 1206 07/22/20 1215   BP: (!) 118/56 120/67     Pulse: 75 75     Resp: 16   16   Temp:   97 °F (36.1 °C)    TempSrc:   Oral    Weight:       Height:                                                  BP Readings from Last 3 Encounters:   07/22/20 120/67   10/15/17 112/70   09/19/17 126/72       NPO Status: Time of last liquid consumption: 0445                        Time of last solid consumption: 0415                        Date of last liquid consumption: 07/22/20                        Date of last solid food consumption: 07/22/20    BMI:   Wt Readings from Last 3 Encounters:   07/22/20 204 lb 6.4 oz (92.7 kg)   10/13/17 194 lb (88 kg)   10/01/17 188 lb (85.3 kg)     Body mass index is 32.99 kg/m². CBC:   Lab Results   Component Value Date    WBC 11.1 07/22/2020    RBC 3.81 07/22/2020    HGB 12.4 07/22/2020    HCT 37.7 07/22/2020    MCV 99.0 07/22/2020    RDW 13.8 10/13/2017     07/22/2020       CMP: No results found for: NA, K, CL, CO2, BUN, CREATININE, GFRAA, AGRATIO, LABGLOM, GLUCOSE, PROT, CALCIUM, BILITOT, ALKPHOS, AST, ALT    POC Tests: No results for input(s): POCGLU, POCNA, POCK, POCCL, POCBUN, POCHEMO, POCHCT in the last 72 hours. Coags: No results found for: PROTIME, INR, APTT    HCG (If Applicable):   Lab Results   Component Value Date    PREGTESTUR NEGATIVE 01/15/2013        ABGs: No results found for: PHART, PO2ART, EAP1MGW, UFW5FSU, BEART, R6FNEYLP     Type & Screen (If Applicable):  Lab Results   Component Value Date    LABRH POS 07/22/2020       Drug/Infectious Status (If Applicable):  No results found for: HIV, HEPCAB    COVID-19 Screening (If Applicable): No results found for: COVID19      Anesthesia Evaluation   history of anesthetic complications:   Airway: Mallampati: II        Dental:          Pulmonary:normal exam    (+) asthma:           Patient did not smoke on day of surgery. Cardiovascular:  Exercise tolerance: good (>4 METS),                     Neuro/Psych:   Negative Neuro/Psych ROS              GI/Hepatic/Renal: Neg GI/Hepatic/Renal ROS            Endo/Other: Negative Endo/Other ROS             Pt had no PAT visit       Abdominal:           Vascular: negative vascular ROS. Anesthesia Plan      spinal     ASA 2             Anesthetic plan and risks discussed with patient.                       Buddy Jones MD   7/22/2020

## 2020-07-22 NOTE — FLOWSHEET NOTE
Dr Michaela Cleaning phoned to the unit, informed  38w3d patient of her's here for induction for IUGR. GBS negative. FHTs reactive, rare contractions. VE /-2, intact. Pitocin infusing. Vitals stable. Continue to work towards vaginal delivery.

## 2020-07-22 NOTE — FLOWSHEET NOTE
Pt taken to [phone] via wheelchair with infant in arms, both in stable condition. Report given to RICHARD Myles. Pt d/t void x2.

## 2020-07-23 VITALS
SYSTOLIC BLOOD PRESSURE: 136 MMHG | BODY MASS INDEX: 32.85 KG/M2 | WEIGHT: 204.4 LBS | HEART RATE: 68 BPM | RESPIRATION RATE: 18 BRPM | DIASTOLIC BLOOD PRESSURE: 59 MMHG | HEIGHT: 66 IN | OXYGEN SATURATION: 100 % | TEMPERATURE: 98.6 F

## 2020-07-23 LAB
HCT VFR BLD CALC: 36.8 % (ref 37–47)
HEMOGLOBIN: 11.7 GM/DL (ref 12–16)

## 2020-07-23 PROCEDURE — 85014 HEMATOCRIT: CPT

## 2020-07-23 PROCEDURE — 36415 COLL VENOUS BLD VENIPUNCTURE: CPT

## 2020-07-23 PROCEDURE — 6360000002 HC RX W HCPCS: Performed by: OBSTETRICS & GYNECOLOGY

## 2020-07-23 PROCEDURE — 6370000000 HC RX 637 (ALT 250 FOR IP): Performed by: OBSTETRICS & GYNECOLOGY

## 2020-07-23 PROCEDURE — 90715 TDAP VACCINE 7 YRS/> IM: CPT | Performed by: OBSTETRICS & GYNECOLOGY

## 2020-07-23 PROCEDURE — 85018 HEMOGLOBIN: CPT

## 2020-07-23 PROCEDURE — 90471 IMMUNIZATION ADMIN: CPT | Performed by: OBSTETRICS & GYNECOLOGY

## 2020-07-23 RX ORDER — ACETAMINOPHEN 325 MG/1
650 TABLET ORAL EVERY 6 HOURS PRN
Qty: 120 TABLET | Refills: 3 | COMMUNITY
Start: 2020-07-23

## 2020-07-23 RX ORDER — IBUPROFEN 600 MG/1
600 TABLET ORAL EVERY 6 HOURS PRN
Qty: 30 TABLET | Refills: 1 | COMMUNITY
Start: 2020-07-23

## 2020-07-23 RX ADMIN — ACETAMINOPHEN 650 MG: 325 TABLET ORAL at 11:23

## 2020-07-23 RX ADMIN — TETANUS TOXOID, REDUCED DIPHTHERIA TOXOID AND ACELLULAR PERTUSSIS VACCINE, ADSORBED 0.5 ML: 5; 2.5; 8; 8; 2.5 SUSPENSION INTRAMUSCULAR at 09:10

## 2020-07-23 RX ADMIN — IBUPROFEN 800 MG: 800 TABLET, FILM COATED ORAL at 00:41

## 2020-07-23 RX ADMIN — HYDROCODONE BITARTRATE AND ACETAMINOPHEN 1 TABLET: 5; 325 TABLET ORAL at 16:08

## 2020-07-23 RX ADMIN — IBUPROFEN 800 MG: 800 TABLET, FILM COATED ORAL at 09:10

## 2020-07-23 ASSESSMENT — PAIN SCALES - GENERAL
PAINLEVEL_OUTOF10: 4
PAINLEVEL_OUTOF10: 6
PAINLEVEL_OUTOF10: 5
PAINLEVEL_OUTOF10: 1

## 2020-07-23 NOTE — PLAN OF CARE
Problem: Discharge Planning:  Goal: Discharged to appropriate level of care  Description: Discharged to appropriate level of care  7/23/2020 1105 by Brittny Alarcon RN  Outcome: Ongoing  Note: Plan of care discussed     Problem: Constipation:  Goal: Bowel elimination is within specified parameters  Description: Bowel elimination is within specified parameters  7/23/2020 1105 by Brittny Alarcon RN  Outcome: Ongoing  Note: Encouraged fluids and fiber     Problem: Fluid Volume - Imbalance:  Goal: Absence of postpartum hemorrhage signs and symptoms  Description: Absence of postpartum hemorrhage signs and symptoms  7/23/2020 1105 by Brittny Alarcon RN  Outcome: Ongoing  Note: Minimal bleeding noted     Problem: Infection - Risk of, Puerperal Infection:  Goal: Will show no infection signs and symptoms  Description: Will show no infection signs and symptoms  7/23/2020 1105 by Brittny Alarcon RN  Outcome: Ongoing  Note: No foul smelling drainage noted     Problem: Mood - Altered:  Goal: Mood stable  Description: Mood stable  7/23/2020 1105 by Brittny Alarcon RN  Outcome: Ongoing  Note: Bonding well with infant     Problem: Pain - Acute:  Goal: Pain level will decrease  Description: Pain level will decrease  7/23/2020 1105 by Brittny Alarcon RN  Outcome: Completed  Note: Goal is 6, pt taking motrin for pain relief     Problem: Pain:  Goal: Pain level will decrease  Description: Pain level will decrease  7/23/2020 1105 by Brittny Alarcon RN  Outcome: Completed  Note: Goal is 6, pt taking motrin for pain relief   Care plan reviewed with patient. Patient and verbalize understanding of the plan of care and contribute to goal setting.

## 2020-07-23 NOTE — FLOWSHEET NOTE
Pt up to bathroom with assist for second time to void without difficulty. Scant amount of rubra noted. Imani care completed independently by pt. Pt returned to bed and fundus firm and U/-2. Tolerated well. Pt denied needs at this time. Call light within reach.

## 2020-07-23 NOTE — ANESTHESIA POSTPROCEDURE EVALUATION
Department of Anesthesiology  Postprocedure Note    Patient: Elie Contreras  MRN: [id number]  YOB: 1995  Date of evaluation: 7/23/2020  Time:  10:53 AM     Procedure Summary     Date:  07/22/20 Room / Location:      Anesthesia Start:  1310 Anesthesia Stop:  2143    Procedure:  Labor Analgesia Diagnosis:      Scheduled Providers:   Responsible Provider:  Josh Littlejohn MD    Anesthesia Type:  spinal ASA Status:  2          Anesthesia Type: spinal    Kuldeep Phase I: Kuldeep Score: 10    Kuldeep Phase II: Kuldeep Score: 10    Last vitals: Reviewed and per EMR flowsheets.        Anesthesia Post Evaluation    Patient location during evaluation: floor  Patient participation: complete - patient participated  Level of consciousness: awake  Airway patency: patent  Nausea & Vomiting: no vomiting and no nausea  Complications: no  Cardiovascular status: hemodynamically stable  Respiratory status: acceptable  Hydration status: stable

## 2020-07-23 NOTE — LACTATION NOTE
Provided and discussed breastfeeding packet with pt. Pt. Stated she has a breast pump. Pt. Stated she has no questions or concerns at this time. Encouraged pt. To call lactation with any questions or if she needs assistance with breastfeeding.

## 2020-07-23 NOTE — DISCHARGE SUMMARY
Obstetric Discharge Summary      Pt Name: Elan Rock  MRN: [id number] [place] #: [de-identified]  YOB: 1995        Admitting Diagnosis  22 y.o.  @ 38+3 wks IUP presented with IOL for suspected fetal growth abnormality. Reasons for Admission on 2020  5:24 AM  Vaginal Delivery    Hospital course: 22 y.o.  @ 38+3 wks IUP presented with IOL for suspected fetal growth abnormality. She progressed well and delivered via . Her post partum course was uncomplicated. She was discharged home on PPD#1 in good condition. Postpartum/Operative Complications  none    Discharge Diagnosis  male infant      Discharge Information  Current Discharge Medication List      START taking these medications    Details   ibuprofen (ADVIL;MOTRIN) 600 MG tablet Take 1 tablet by mouth every 6 hours as needed for Pain  Qty: 30 tablet, Refills: 1      acetaminophen (AMINOFEN) 325 MG tablet Take 2 tablets by mouth every 6 hours as needed for Pain  Qty: 120 tablet, Refills: 3         CONTINUE these medications which have NOT CHANGED    Details   Prenatal Vit w/Eg-Pobbrfdof-WF (PNV PO) Take by mouth               Diet: regular  Activity: nothing in the vagina for 6 weeks-no sex, no tampons, no douching, no heavy lifting, limited activity for 2-3 weeks  Discharge to:  home  Follow up in 6 wks.     Anshul Oliveros  6:54 AM  2020

## 2021-02-19 NOTE — ANESTHESIA PROCEDURE NOTES
Epidural Block    Patient location during procedure: OB  Start time: 10/13/2017 5:50 PM  End time: 10/13/2017 6:10 PM  Preanesthetic Checklist  Completed: patient identified, site marked, surgical consent, pre-op evaluation, timeout performed, IV checked, risks and benefits discussed, monitors and equipment checked, anesthesia consent given, oxygen available and patient being monitored  Epidural  Patient position: sitting  Prep: ChloraPrep  Patient monitoring: continuous pulse ox and frequent blood pressure checks  Approach: midline  Location: lumbar (1-5)  Injection technique: REMI saline  Provider prep: mask and sterile gloves  Needle  Needle type: Tuohy   Needle gauge: 18 G  Needle insertion depth: 6 cm  Catheter type: side hole  Catheter size: 19 G  Catheter at skin depth: 11 cm  Test dose: negative  Assessment  Attempts: 1 Thank you for choosing the Pulmonary Services Department, at [facility], as your Pulmonary Specialists.  We actively use feedback to constantly improve and deliver the best care possible. To provide the best experience, we are collecting feedback from you on how we performed.  You may receive a survey in the mail or through your e-mail to evaluate how we did. Please take a moment and share your thoughts.      If for any reason you feel that we did not meet your expectations or you want to share a positive experience, please give us a call. Your feedback helps us know how we are doing and what we can be doing better.    Office hours: 8:00 am to 4:30 pm, Monday - Friday  Phone: [phone] Option #2      YOUR TEST RESULTS    If you have any concerns regarding any testing ordered with your visit, please contact our office at the above number.    Otherwise, your test results will be communicated to you in one of the following ways:    - Follow-up office visit  - Phone call  - Through [facility]  - Mailed letter      If you are prescribed an inhaler or a medicine that you find to be too expensive, please be aware that your physician has no way to know what your cost will be. All copayments are specific to you and your insurance plan.    If you find the medication prescribed to be too expensive, please consider:  • Do you have a deductible you need to meet?  • Are you in your coverage gap (or donut hole)?  • Inhalers are very expensive, and most are only available as brand name (costing more than $500 without insurance)    If you are unable to afford your copay, please:  • Call your insurance company and ask which medication would be the least expensive for you (the telephone number is often located on the back of your insurance card under “Member Services”)  • Contact our office with the name or names of alternate medications so we can consider prescribing for you    Thank You,  Pulmonary Services                                                                                                                                                                                                                                                                      [phone]

## 2021-07-03 ENCOUNTER — NURSE TRIAGE (OUTPATIENT)
Dept: OTHER | Facility: CLINIC | Age: 26
End: 2021-07-03

## 2021-07-03 NOTE — TELEPHONE ENCOUNTER
Reason for Disposition   Patient sounds very sick or weak to the triager    Answer Assessment - Initial Assessment Questions  1. ONSET: \"When did the cough begin? \"       7/2/21    2. SEVERITY: \"How bad is the cough today? \"      Severe - unable to speak without coughing    3. RESPIRATORY DISTRESS: \"Describe your breathing. \"     Per pt's , pt appeared to be distraught in the shower and was having trouble breathing. 4. FEVER: \"Do you have a fever? \" If so, ask: \"What is your temperature, how was it measured, and when did it start? \"      states that pt feels warm    5. HEMOPTYSIS: \"Are you coughing up any blood? \" If so ask: \"How much? \" (flecks, streaks, tablespoons, etc.)    None    6. TREATMENT: \"What have you done so far to treat the cough? \" (e.g., meds, fluids, humidifier)    Ibuprofen, but has not taken anything to treat the cough    7. CARDIAC HISTORY: \"Do you have any history of heart disease? \" (e.g., heart attack, congestive heart failure)    none    8. LUNG HISTORY: \"Do you have any history of lung disease? \"  (e.g., pulmonary embolus, asthma, emphysema)   sports induced asthma    9. PE RISK FACTORS: \"Do you have a history of blood clots? \" (or: recent major surgery, recent prolonged travel, bedridden)   recent prolonged travel, two 6 hour days in the car    10. OTHER SYMPTOMS: \"Do you have any other symptoms? (e.g., runny nose, wheezing, chest pain)     Chest pain from coughing. Stomach pain    11. PREGNANCY: \"Is there any chance you are pregnant? \" \"When was your last menstrual period? \"    No, d/t birth control    12. TRAVEL: \"Have you traveled out of the country in the last month? \" (e.g., travel history, exposures)     [facility] two weeks ago    Protocols used: COUGH - ACUTE NON-PRODUCTIVE-ADULT-AH    Brief description of triage: pt DX COVID on 7/2/21. Pt continuous cough, N/V, unable to hold fluids down, pt's  concerned pt becoming dehydrated.  Has not tolerated meds other than some ibuprofen. Triage indicates for patient to be evaluated by Provider. Care advice provided, patient verbalizes understanding; denies any other questions or concerns; instructed to call back for any new or worsening symptoms. This triage is a result of a call to Nancy mcarthur Nurse. Please do not respond to the triage nurse through this encounter. Any subsequent communication should be directly with the patient.

## 2021-07-21 ENCOUNTER — NURSE TRIAGE (OUTPATIENT)
Dept: OTHER | Facility: CLINIC | Age: 26
End: 2021-07-21

## 2021-07-22 ENCOUNTER — APPOINTMENT (OUTPATIENT)
Dept: MRI IMAGING | Age: 26
End: 2021-07-22
Payer: COMMERCIAL

## 2021-07-22 ENCOUNTER — HOSPITAL ENCOUNTER (EMERGENCY)
Age: 26
Discharge: HOME | End: 2021-07-22
Attending: EMERGENCY MEDICINE
Payer: COMMERCIAL

## 2021-07-22 VITALS
HEIGHT: 65 IN | SYSTOLIC BLOOD PRESSURE: 109 MMHG | OXYGEN SATURATION: 98 % | WEIGHT: 180 LBS | DIASTOLIC BLOOD PRESSURE: 68 MMHG | TEMPERATURE: 98.3 F | RESPIRATION RATE: 16 BRPM | BODY MASS INDEX: 29.99 KG/M2 | HEART RATE: 86 BPM

## 2021-07-22 DIAGNOSIS — U07.1 COVID-19: ICD-10-CM

## 2021-07-22 DIAGNOSIS — J18.9 PNEUMONIA OF RIGHT LOWER LOBE DUE TO INFECTIOUS ORGANISM: Primary | ICD-10-CM

## 2021-07-22 LAB
ALBUMIN SERPL-MCNC: 4.2 G/DL (ref 3.5–5.1)
ALP BLD-CCNC: 78 U/L (ref 38–126)
ALT SERPL-CCNC: 10 U/L (ref 11–66)
AMPHETAMINE+METHAMPHETAMINE URINE SCREEN: NORMAL
ANION GAP SERPL CALCULATED.3IONS-SCNC: 16 MEQ/L (ref 8–16)
AST SERPL-CCNC: 10 U/L (ref 5–40)
BACTERIA: ABNORMAL /HPF
BARBITURATE QUANTITATIVE URINE: NEGATIVE
BASOPHILS # BLD: 0.3 %
BASOPHILS ABSOLUTE: 0 THOU/MM3 (ref 0–0.1)
BENZODIAZEPINE QUANTITATIVE URINE: NORMAL
BILIRUB SERPL-MCNC: 0.8 MG/DL (ref 0.3–1.2)
BILIRUBIN DIRECT: < 0.2 MG/DL (ref 0–0.3)
BILIRUBIN URINE: NEGATIVE
BLOOD, URINE: ABNORMAL
BUN BLDV-MCNC: 4 MG/DL (ref 7–22)
CALCIUM SERPL-MCNC: 8.9 MG/DL (ref 8.5–10.5)
CANNABINOID QUANTITATIVE URINE: NORMAL
CASTS 2: ABNORMAL /LPF
CASTS UA: ABNORMAL /LPF
CHARACTER, URINE: ABNORMAL
CHLORIDE BLD-SCNC: 109 MEQ/L (ref 98–111)
CO2: 17 MEQ/L (ref 23–33)
COCAINE METABOLITE QUANTITATIVE URINE: NEGATIVE
COLOR: YELLOW
CREAT SERPL-MCNC: 0.5 MG/DL (ref 0.4–1.2)
CRYSTALS, UA: ABNORMAL
D-DIMER QUANTITATIVE: 4721 NG/ML FEU (ref 0–500)
EKG ATRIAL RATE: 125 BPM
EKG P AXIS: 62 DEGREES
EKG P-R INTERVAL: 132 MS
EKG Q-T INTERVAL: 316 MS
EKG QRS DURATION: 76 MS
EKG QTC CALCULATION (BAZETT): 456 MS
EKG R AXIS: 86 DEGREES
EKG T AXIS: -66 DEGREES
EKG VENTRICULAR RATE: 125 BPM
EOSINOPHIL # BLD: 0.1 %
EOSINOPHILS ABSOLUTE: 0 THOU/MM3 (ref 0–0.4)
EPITHELIAL CELLS, UA: ABNORMAL /HPF
ERYTHROCYTE [DISTWIDTH] IN BLOOD BY AUTOMATED COUNT: 12.9 % (ref 11.5–14.5)
ERYTHROCYTE [DISTWIDTH] IN BLOOD BY AUTOMATED COUNT: 44.5 FL (ref 35–45)
GFR SERPL CREATININE-BSD FRML MDRD: > 90 ML/MIN/1.73M2
GLUCOSE BLD-MCNC: 97 MG/DL (ref 70–108)
GLUCOSE URINE: NEGATIVE MG/DL
HCT VFR BLD CALC: 36.1 % (ref 37–47)
HEMOGLOBIN: 11.7 GM/DL (ref 12–16)
IMMATURE GRANS (ABS): 0.04 THOU/MM3 (ref 0–0.07)
IMMATURE GRANULOCYTES: 0.5 %
KETONES, URINE: >= 160
LEUKOCYTE ESTERASE, URINE: ABNORMAL
LIPASE: 30.3 U/L (ref 5.6–51.3)
LYMPHOCYTES # BLD: 10.8 %
LYMPHOCYTES ABSOLUTE: 0.9 THOU/MM3 (ref 1–4.8)
MAGNESIUM: 2 MG/DL (ref 1.6–2.4)
MCH RBC QN AUTO: 30.8 PG (ref 26–33)
MCHC RBC AUTO-ENTMCNC: 32.4 GM/DL (ref 32.2–35.5)
MCV RBC AUTO: 95 FL (ref 81–99)
MISCELLANEOUS 2: ABNORMAL
MONOCYTES # BLD: 11.1 %
MONOCYTES ABSOLUTE: 1 THOU/MM3 (ref 0.4–1.3)
NITRITE, URINE: NEGATIVE
NUCLEATED RED BLOOD CELLS: 0 /100 WBC
OPIATES, URINE: POSITIVE
OSMOLALITY CALCULATION: 279.9 MOSMOL/KG (ref 275–300)
OXYCODONE: NEGATIVE
PH UA: 5.5 (ref 5–9)
PHENCYCLIDINE QUANTITATIVE URINE: NEGATIVE
PLATELET # BLD: 191 THOU/MM3 (ref 130–400)
PMV BLD AUTO: 10.5 FL (ref 9.4–12.4)
POTASSIUM SERPL-SCNC: 3.4 MEQ/L (ref 3.5–5.2)
PRO-BNP: 51.9 PG/ML (ref 0–450)
PROTEIN UA: NEGATIVE
RBC # BLD: 3.8 MILL/MM3 (ref 4.2–5.4)
RBC URINE: > 100 /HPF
RENAL EPITHELIAL, UA: ABNORMAL
SEG NEUTROPHILS: 77.2 %
SEGMENTED NEUTROPHILS ABSOLUTE COUNT: 6.7 THOU/MM3 (ref 1.8–7.7)
SODIUM BLD-SCNC: 142 MEQ/L (ref 135–145)
SPECIFIC GRAVITY, URINE: 1.02 (ref 1–1.03)
TOTAL PROTEIN: 6.9 G/DL (ref 6.1–8)
TROPONIN T: < 0.01 NG/ML
TSH SERPL DL<=0.05 MIU/L-ACNC: 4.81 UIU/ML (ref 0.4–4.2)
UROBILINOGEN, URINE: 1 EU/DL (ref 0–1)
WBC # BLD: 8.7 THOU/MM3 (ref 4.8–10.8)
WBC UA: ABNORMAL /HPF
YEAST: ABNORMAL

## 2021-07-22 PROCEDURE — 72158 MRI LUMBAR SPINE W/O & W/DYE: CPT

## 2021-07-22 PROCEDURE — 6360000002 HC RX W HCPCS: Performed by: EMERGENCY MEDICINE

## 2021-07-22 PROCEDURE — 36415 COLL VENOUS BLD VENIPUNCTURE: CPT

## 2021-07-22 PROCEDURE — 72156 MRI NECK SPINE W/O & W/DYE: CPT

## 2021-07-22 PROCEDURE — 84443 ASSAY THYROID STIM HORMONE: CPT

## 2021-07-22 PROCEDURE — 87086 URINE CULTURE/COLONY COUNT: CPT

## 2021-07-22 PROCEDURE — 72157 MRI CHEST SPINE W/O & W/DYE: CPT

## 2021-07-22 PROCEDURE — 99285 EMERGENCY DEPT VISIT HI MDM: CPT

## 2021-07-22 PROCEDURE — 83690 ASSAY OF LIPASE: CPT

## 2021-07-22 PROCEDURE — 2580000003 HC RX 258: Performed by: EMERGENCY MEDICINE

## 2021-07-22 PROCEDURE — 83735 ASSAY OF MAGNESIUM: CPT

## 2021-07-22 PROCEDURE — 80053 COMPREHEN METABOLIC PANEL: CPT

## 2021-07-22 PROCEDURE — 6360000004 HC RX CONTRAST MEDICATION: Performed by: EMERGENCY MEDICINE

## 2021-07-22 PROCEDURE — 85379 FIBRIN DEGRADATION QUANT: CPT

## 2021-07-22 PROCEDURE — 81001 URINALYSIS AUTO W/SCOPE: CPT

## 2021-07-22 PROCEDURE — 80305 DRUG TEST PRSMV DIR OPT OBS: CPT

## 2021-07-22 PROCEDURE — 84484 ASSAY OF TROPONIN QUANT: CPT

## 2021-07-22 PROCEDURE — 82248 BILIRUBIN DIRECT: CPT

## 2021-07-22 PROCEDURE — 83880 ASSAY OF NATRIURETIC PEPTIDE: CPT

## 2021-07-22 PROCEDURE — 70553 MRI BRAIN STEM W/O & W/DYE: CPT

## 2021-07-22 PROCEDURE — 85025 COMPLETE CBC W/AUTO DIFF WBC: CPT

## 2021-07-22 PROCEDURE — 93005 ELECTROCARDIOGRAM TRACING: CPT | Performed by: EMERGENCY MEDICINE

## 2021-07-22 PROCEDURE — A9579 GAD-BASE MR CONTRAST NOS,1ML: HCPCS | Performed by: EMERGENCY MEDICINE

## 2021-07-22 PROCEDURE — 96374 THER/PROPH/DIAG INJ IV PUSH: CPT

## 2021-07-22 RX ORDER — LEVOFLOXACIN 250 MG/1
750 TABLET ORAL DAILY
COMMUNITY

## 2021-07-22 RX ORDER — 0.9 % SODIUM CHLORIDE 0.9 %
1000 INTRAVENOUS SOLUTION INTRAVENOUS ONCE
Status: COMPLETED | OUTPATIENT
Start: 2021-07-22 | End: 2021-07-22

## 2021-07-22 RX ORDER — OXYCODONE HYDROCHLORIDE AND ACETAMINOPHEN 5; 325 MG/1; MG/1
1 TABLET ORAL EVERY 6 HOURS PRN
Qty: 12 TABLET | Refills: 0 | Status: SHIPPED | OUTPATIENT
Start: 2021-07-22 | End: 2021-07-25

## 2021-07-22 RX ORDER — LORAZEPAM 2 MG/ML
1 INJECTION INTRAMUSCULAR ONCE
Status: COMPLETED | OUTPATIENT
Start: 2021-07-22 | End: 2021-07-22

## 2021-07-22 RX ADMIN — SODIUM CHLORIDE 1000 ML: 9 INJECTION, SOLUTION INTRAVENOUS at 07:38

## 2021-07-22 RX ADMIN — LORAZEPAM 1 MG: 2 INJECTION INTRAMUSCULAR; INTRAVENOUS at 04:10

## 2021-07-22 RX ADMIN — GADOTERIDOL 15 ML: 279.3 INJECTION, SOLUTION INTRAVENOUS at 05:36

## 2021-07-22 ASSESSMENT — ENCOUNTER SYMPTOMS
VOICE CHANGE: 0
COUGH: 0
ABDOMINAL PAIN: 0
EYE REDNESS: 0
CHEST TIGHTNESS: 0
RHINORRHEA: 0
CONSTIPATION: 0
WHEEZING: 0
DIARRHEA: 0
SHORTNESS OF BREATH: 0
BLOOD IN STOOL: 0
NAUSEA: 0
PHOTOPHOBIA: 0
TROUBLE SWALLOWING: 0
VOMITING: 0
BACK PAIN: 1
EYE DISCHARGE: 0
ABDOMINAL DISTENTION: 0
EYE ITCHING: 0
SORE THROAT: 0
SINUS PRESSURE: 0
CHOKING: 0
EYE PAIN: 0

## 2021-07-22 ASSESSMENT — PAIN DESCRIPTION - LOCATION: LOCATION: GENERALIZED;ABDOMEN;FLANK

## 2021-07-22 ASSESSMENT — PAIN DESCRIPTION - PAIN TYPE: TYPE: ACUTE PAIN

## 2021-07-22 ASSESSMENT — PAIN DESCRIPTION - FREQUENCY: FREQUENCY: CONTINUOUS

## 2021-07-22 ASSESSMENT — PAIN DESCRIPTION - ORIENTATION: ORIENTATION: RIGHT;LEFT

## 2021-07-22 ASSESSMENT — PAIN SCALES - GENERAL: PAINLEVEL_OUTOF10: 6

## 2021-07-22 NOTE — TELEPHONE ENCOUNTER
Reason for Disposition   Severe difficulty breathing (e.g., struggling for each breath, speaks in single words)    Answer Assessment - Initial Assessment Questions  1. LOCATION: \"Where does it hurt? \"        Rib cage/chest pain - right side - under the right breast     2. RADIATION: \"Does the pain go anywhere else? \" (e.g., into neck, jaw, arms, back)      Radiates to the center of the back- into the neck and right shoulder - \"radiates into my side - curves around my ear and into my neck - I know something is not right- I know my body\"     3. ONSET: \"When did the chest pain begin? \" (Minutes, hours or days)       Started when I was diagnosed with COVID- \" it has worsened on the right side since yesterday\"     4. PATTERN \"Does the pain come and go, or has it been constant since it started? \"  \"Does it get worse with exertion? \"       Constant     5. DURATION: \"How long does it last\" (e.g., seconds, minutes, hours)      Lasted for hours     6. SEVERITY: \"How bad is the pain? \"  (e.g., Scale 1-10; mild, moderate, or severe)     - MILD (1-3): doesn't interfere with normal activities      - MODERATE (4-7): interferes with normal activities or awakens from sleep     - SEVERE (8-10): excruciating pain, unable to do any normal activities        \"about as much as I can take\"- \"my right side is immobile due to pain- I hardly want to breathe because I hurt so bad\"     7. CARDIAC RISK FACTORS: \"Do you have any history of heart problems or risk factors for heart disease? \" (e.g., angina, prior heart attack; diabetes, high blood pressure, high cholesterol, smoker, or strong family history of heart disease)     Denies     8. PULMONARY RISK FACTORS: \"Do you have any history of lung disease? \"  (e.g., blood clots in lung, asthma, emphysema, birth control pills)      Pt was diagnosed with COVID July 3-were hospitalized for 2 days   Pt was seen in the ED yesterday - was diagnosed with Pancreatitis and Pneumonia- pt states her pain has worsened since ED visit yesterday     Pt states she has sports induced asthma -has Nexplanon for birth control      9. CAUSE: \"What do you think is causing the chest pain? \"      Pt is unsure - COVID related symptoms -long term COVID complications - concerned about back problems     10. OTHER SYMPTOMS: \"Do you have any other symptoms? \" (e.g., dizziness, nausea, vomiting, sweating, fever, difficulty breathing, cough)        Loss of appetite due to pain, nausea, numbness in both hands, \"my left hand is numb and my whole right arm is throbbing with pain - tingling\" , shortness of breath \"constant- all the time- taking shorter breaths than I normally do\"    11. PREGNANCY: \"Is there any chance you are pregnant? \" \"When was your last menstrual period? \"        Denies - LMP - \"I don't get a period\" - started bleeding while I was in the hospital and it has not stopped    Protocols used: CHEST PAIN-ADULT-AH    Brief description of triage:pt states she had a diagnosis of COVID July 3, 2021 and was seen in the ED yesterday 7/20/2021 with a diagnosis of pneumonia and pancreatitis. She reports that her symptoms have worsened today and she is now having increased shortness of breath, right pain under the breast- rib cage/chest pain, right shoulder pain, right back pain and neck pain. She states she has a Nexplanon implant for birth control and has not had a period in years but started \"bleeding while in the hospital for COVID\" and it has not stopped. She reports that she is struggling to breathe and her voice is shaky. She also reports some numbness in in both hands and throbbing tingling pain in the right arm. Triage indicates for patient to Call 911 now- pt does not want to call 911- she states she lives 3 blocks from the hospital and will have her sister drive her to the ED. Advised pt to elana 911 if unable to get in the care or if symptoms worsen. Pt in agreement with this plan.      Care advice provided, patient verbalizes understanding; denies any other questions or concerns; instructed to call back for any new or worsening symptoms. This triage is a result of a call to Nancy a Nurse. Please do not respond to the triage nurse through this encounter. Any subsequent communication should be directly with the patient.

## 2021-07-22 NOTE — ED PROVIDER NOTES
polyphagia and polyuria. Genitourinary: Negative for decreased urine volume, difficulty urinating, dysuria, enuresis, frequency, hematuria, pelvic pain, urgency, vaginal bleeding and vaginal discharge. Musculoskeletal: Positive for arthralgias, back pain, myalgias and neck pain. Negative for gait problem and neck stiffness. Skin: Negative for pallor and rash. Allergic/Immunologic: Negative for immunocompromised state. Neurological: Positive for numbness. Negative for dizziness, tremors, seizures, syncope, facial asymmetry, weakness, light-headedness and headaches. Hematological: Negative for adenopathy. Does not bruise/bleed easily. Psychiatric/Behavioral: Negative for agitation, hallucinations and suicidal ideas. The patient is not nervous/anxious. PAST MEDICAL HISTORY    has a past medical history of Asthma. SURGICAL HISTORY      has a past surgical history that includes Manip of Frac Nasal Bone and Splint Stab (1/15/13); [place] tooth extraction; and Cholecystectomy. CURRENT MEDICATIONS       Previous Medications    ACETAMINOPHEN (AMINOFEN) 325 MG TABLET    Take 2 tablets by mouth every 6 hours as needed for Pain    IBUPROFEN (ADVIL;MOTRIN) 600 MG TABLET    Take 1 tablet by mouth every 6 hours as needed for Pain    LEVOFLOXACIN (LEVAQUIN) 250 MG TABLET    Take 750 mg by mouth daily    PRENATAL VIT W/[id number] (PNV PO)    Take by mouth       ALLERGIES     is allergic to amoxicillin-pot clavulanate, codeine, and penicillins. FAMILY HISTORY     She indicated that her mother is alive. She indicated that her father is alive. family history includes Asthma in her father; No Known Problems in her mother. SOCIAL HISTORY      reports that she has never smoked. She has never used smokeless tobacco. She reports that she does not drink alcohol and does not use drugs. PHYSICAL EXAM     INITIAL VITALS:  height is 5' 5\" (1.651 m) and weight is 180 lb (81.6 kg).  Her oral temperature Palpations: Abdomen is soft. There is no mass. Tenderness: There is no abdominal tenderness. There is no guarding or rebound. Hernia: No hernia is present. Musculoskeletal:         General: Normal range of motion. Cervical back: Normal range of motion and neck supple. Tenderness present. No swelling, edema, deformity, erythema, signs of trauma, lacerations, rigidity, spasms, torticollis, bony tenderness or crepitus. No pain with movement, spinous process tenderness or muscular tenderness. Normal range of motion. Thoracic back: Tenderness present. No swelling, edema, deformity, signs of trauma, lacerations, spasms or bony tenderness. Normal range of motion. No scoliosis. Lumbar back: Tenderness present. No swelling, edema, deformity, signs of trauma, lacerations, spasms or bony tenderness. Normal range of motion. Negative right straight leg raise test and negative left straight leg raise test. No scoliosis. Right lower leg: No edema. Left lower leg: No edema. Comments: Rotation compression positive, distraction negative, lateral distraction negative   Lymphadenopathy:      Cervical: No cervical adenopathy. Right cervical: No superficial or deep cervical adenopathy. Left cervical: No superficial or deep cervical adenopathy. Skin:     General: Skin is warm and dry. Findings: No bruising, ecchymosis, lesion or rash. Neurological:      General: No focal deficit present. Mental Status: She is alert and oriented to person, place, and time. Mental status is at baseline. GCS: GCS eye subscore is 4. GCS verbal subscore is 5. GCS motor subscore is 6. Cranial Nerves: Cranial nerves are intact. No cranial nerve deficit. Sensory: Sensation is intact. No sensory deficit. Motor: Motor function is intact. No weakness. Coordination: Coordination is intact. Coordination normal.      Gait: Gait is intact.  Gait normal.      Deep Tendon Reflexes: Reflexes are normal and symmetric. Reflexes normal.      Reflex Scores:       Tricep reflexes are 2+ on the right side and 2+ on the left side. Bicep reflexes are 2+ on the right side and 2+ on the left side. Brachioradialis reflexes are 2+ on the right side and 2+ on the left side. Patellar reflexes are 2+ on the right side and 2+ on the left side. Achilles reflexes are 2+ on the right side and 2+ on the left side. Psychiatric:         Speech: Speech normal.         Behavior: Behavior normal.         Thought Content: Thought content normal.         Judgment: Judgment normal.           DIFFERENTIAL DIAGNOSIS:   Pain post viral toxidrome, pneumonia, bronchitis, less likely spinal cord injury. DIAGNOSTIC RESULTS     EKG: All EKG's are interpreted by the Emergency Department Physician who either signs or Co-signs this chart in the absence of a cardiologist.  EKG reveals sinus tachycardia normal axis ventricular rate 105 AZ interval 124 QRS duration 76 QT interval 320 QTC of 381. Repeat EKG reveals sinus tachycardia ventricular rate of 125 AZ interval 132 QRS duration of 76 QT interval 316 QTC of 456. RADIOLOGY: non-plain film images(s) such as CT, Ultrasound and MRI are read by the radiologist.  MRI C/ Jennifer 29   Final Result   Unremarkable brain MRI. This document has been electronically signed by: Huyen Modi MD on    07/22/2021 06:23 AM      MRI CERVICAL SPINE W WO CONTRAST   Final Result   Unremarkable MRI of the cervical spine. This document has been electronically signed by: Caprice Park MD on    07/22/2021 06:04 AM      MRI THORACIC SPINE W WO CONTRAST   Final Result   No acute findings through the thoracic spine. Thoracic cord normal size and signal.   Small layering right pleural effusion. This document has been electronically signed by:  Huyen Modi MD on    07/22/2021 06:04 AM      MRI LUMBAR SPINE W WO CONTRAST   Final Result   No acute findings. No cord or nerve root signal abnormality or pathologic enhancement. Degenerative disc changes as described. This document has been electronically signed by: Carla Hernández MD on    07/22/2021 06:04 AM      CT INTERPRETATION OF OUTSIDE IMAGES    (Results Pending)   XR INTERPRET OUTSIDE FILMS    (Results Pending)     CTA chest on 7/21/2021 read by Yue Ring MD, at [facility]: No evidence of pulmonary embolism interval development of subsegmental consolidation right lower lobe likely pneumonia, interval near complete resolution of extensive groundglass densities in the bilateral lungs. LABS:   Labs Reviewed   CBC WITH AUTO DIFFERENTIAL   BRAIN NATRIURETIC PEPTIDE   D-DIMER, QUANTITATIVE   BASIC METABOLIC PANEL   HEPATIC FUNCTION PANEL   LIPASE   TROPONIN   MAGNESIUM   TSH WITHOUT REFLEX   URINE DRUG SCREEN   URINE RT REFLEX TO CULTURE     Labs reported from [facility] on July 21, 2021: White blood cell count 9.4, hemoglobin 12.2, hematocrit 35.9, platelet count 150. CMP read by [facility] 7/21/2021, sodium 139, potassium 3.3, chloride 104, bicarb 19, BUN 8, creatinine 0.6, blood glucose 103. EMERGENCY DEPARTMENT COURSE:   Vitals:    Vitals:    07/22/21 0252 07/22/21 0308 07/22/21 0613   BP: 129/78 133/83 115/81   Pulse: 98 103 120   Resp: 19 20 20   Temp: 98.3 °F (36.8 °C)     TempSrc: Oral     SpO2: 100% 100% 100%   Weight: 180 lb (81.6 kg)     Height: 5' 5\" (1.651 m)       Patient was assessed at bedside appropriate labs and imaging were ordered. MRIs were interpreted. They were all read as normal.  [facility] had not sent over the CT evaluations. Patient still is adamant that something is wrong. Labs were repeated here. Patient remained stable throughout course. Patient is signed out to my morning colleague in stable condition. Patient has what appears to be a pneumonia. She is currently on Levaquin.     CRITICAL CARE:

## 2021-07-22 NOTE — ED NOTES
Pt on call light stating that she cannot feel her arms. This RN touches pt arms and asks if she can feel it. Pt states she is able to feel her arms when this RN touches her. Pt states it feels like something is crushing her arms. Pt is sitting cross legged in bed with her arms stretched out.        Ash Aguilera RN  07/22/21 0429

## 2021-07-22 NOTE — ED NOTES
Pt back from MRI. Pt states she needs to use restroom. Pt also states that she can feel her lungs popping or cracking.      Ximena Mary RN  07/22/21 1017

## 2021-07-22 NOTE — ED NOTES
Pt states her chest is hurting and states it is burning and pt states something is wrong and this RN states the ativan will help with anxiety and to hlpe calm down. PT states this is not anxiety.  Pt is holding her belly and states she might throw up      Lj Figueroa RN  07/22/21 2919

## 2021-07-22 NOTE — ED NOTES
Pt states she needs to use restroom. Pt states she moved the pulse ox to her toe because she thought it might work better but pt states it makes her feel more weird. Pt able to walk to restroom on her own with gait steady. Pt walking as if she is stiff.       Nitish Burgess RN  07/22/21 9890

## 2021-07-22 NOTE — ED NOTES
Pt back from restroom. Pt lung sound auscultated. No crackles heard. VS reassessed. RR unlabored. Pt requested pulse ox to be on her toe again.  Will continue to monitor      Lali Florez RN  07/22/21 5987

## 2021-07-22 NOTE — ED PROVIDER NOTES
Transfer of Care Note:   Physician Signing out: Mandy Patricia GERONIMO  Receiving Physician: Fran Alva M.D. Sign out time: 0700      Brief history:  Patient with recent diagnosis of COVID-19 on July 3, 2021, also currently being treated for lower lobe pneumonia with Levaquin, transferred from outside facility with chest pain, back pain, neck pain, generalized body aches with concern for potential transverse myelitis, patient found to have normal movement and sensation in all extremities as well as trunk. Patient's spine MRI are within normal limits and is pending reinterpretation of chest images from previous facility due to tachycardia. Items pending that need to be checked:  Radiology outside image interpretation  Labs  IV fluid infusion. Tentative Impression of patient:  1. Partially treated pneumonia  2. COVID-19 in recovery phase  3. Dehydration    Expected disposition of patient:  Pending results, discharged. Additional Assessment and results:   I have personally performed a face to face diagnostic evaluation on this patient. The patient's initial evaluation and plan have been discussed with the prior physician who initially evaluated the patient. Nursing Notes, Past Medical Hx, Past Surgical Hx, Social Hx, Allergies, vital signs and Family Hx were all reviewed.       Vitals:    07/22/21 0725   BP: 119/78   Pulse: 120   Resp: 13   Temp:    SpO2: 100%     Physical Exam  Well-appearing, normal pulmonary exam.  Unchanged from overnight physician's exam.      Labs Reviewed   CBC WITH AUTO DIFFERENTIAL - Abnormal; Notable for the following components:       Result Value    RBC 3.80 (*)     Hemoglobin 11.7 (*)     Hematocrit 36.1 (*)     Lymphocytes Absolute 0.9 (*)     All other components within normal limits   D-DIMER, QUANTITATIVE - Abnormal; Notable for the following components:    D-Dimer, Quant 4721.00 (*)     All other components within normal limits   BASIC METABOLIC PANEL - Abnormal; Notable for the following components:    Potassium 3.4 (*)     CO2 17 (*)     BUN 4 (*)     All other components within normal limits   HEPATIC FUNCTION PANEL - Abnormal; Notable for the following components:    ALT 10 (*)     All other components within normal limits   TSH WITHOUT REFLEX - Abnormal; Notable for the following components:    TSH 4.810 (*)     All other components within normal limits   URINE WITH REFLEXED MICRO - Abnormal; Notable for the following components:    Ketones, Urine >= 160 (*)     Blood, Urine LARGE (*)     Leukocyte Esterase, Urine TRACE (*)     All other components within normal limits   CULTURE, REFLEXED, URINE    Narrative:     Source: Specimen not received       Site:           Current Antibiotics:   BRAIN NATRIURETIC PEPTIDE   LIPASE   TROPONIN   MAGNESIUM   ANION GAP   OSMOLALITY   GLOMERULAR FILTRATION RATE, ESTIMATED   RAPID DRUG SCREEN, URINE         Medications   0.9 % sodium chloride bolus (1,000 mLs Intravenous New Bag 7/22/21 0709)   LORazepam (ATIVAN) injection 1 mg (1 mg Intravenous Given 7/22/21 0410)   gadoteridol (PROHANCE) injection 15 mL (15 mLs Intravenous Given 7/22/21 0536)         MRI BRAIN W WO CONTRAST   Final Result   Unremarkable brain MRI. This document has been electronically signed by: Abdullahi Gonzalez MD on    07/22/2021 06:23 AM      MRI CERVICAL SPINE W WO CONTRAST   Final Result   Unremarkable MRI of the cervical spine. This document has been electronically signed by: Brigitte Chatman MD on    07/22/2021 06:04 AM      MRI THORACIC SPINE W WO CONTRAST   Final Result   No acute findings through the thoracic spine. Thoracic cord normal size and signal.   Small layering right pleural effusion. This document has been electronically signed by: Abdullahi Gonzalez MD on    07/22/2021 06:04 AM      MRI LUMBAR SPINE W WO CONTRAST   Final Result   No acute findings. No cord or nerve root signal abnormality or pathologic enhancement.    Degenerative disc changes as described. This document has been electronically signed by: Girma Velasquez MD on    07/22/2021 06:04 AM      CT INTERPRETATION OF OUTSIDE IMAGES    (Results Pending)   XR INTERPRET OUTSIDE FILMS    (Results Pending)         ED Course as of Jul 22 1110   Thu Jul 22, 2021   0809 AMPHETAMINE+METHAMPHETAMINE URINE SCREEN: **POSITIVE** [DT]   0809 Benzodiazepine Quant, Ur: **POSITIVE** [DT]   0809 Cannabinoid Quant, Ur: **POSITIVE** [DT]   1004 Received fax report from [facility] CT angiogram of the chest shows no evidence of acute pulmonary embolism, the study was done July 20, 2021 at 11:19 PM    [DT]   1048 Patient feeling better with medication, work-up within acceptable limits. Drug screen positive for amphetamines, opiates and cannabinoids. She was given Bentyl earlier today. Patient feels comfortable going home and following up with PCP. Will discharge with additional analgesia for a couple of days. [DT]      ED Course User Index  [DT] Richard Jeffries MD         Further MDM and disposition:   Assessment:   1. Partially treated pneumonia  2. COVID-19 recovery phase  Plan:    Will discharge. Final diagnoses:   Pneumonia of right lower lobe due to infectious organism     New Prescriptions    No medications on file         Condition: condition: good  Dispo: Discharge to home    This transcription was electronically signed. It was dictated by use of voice recognition software and electronically transcribed. The transcription may contain errors not detected in proofreading.         Richard Jeffries MD  07/22/21 8713

## 2021-07-22 NOTE — ED NOTES
Pt comes to ED from another facility with c/o chest, neck, back, shoulder, stomach and arm pain. Pt state she had COVID at the beginning of the month. Pt states was dx with non COVID related pneumonia on the 20th of this month. Pt states she has been on ATB for one day. Pt states she was feeling her normal self up until last Sunday and then Sunday night pt states that is when he pain started. Pt states she was seen at another facility on Tuesday and then at the same facility on Wednesday. Pt states she was dx with pancreatitis as well. Pt come stop this ER for MRIs. Pt states she cannot lay flat because then she cannot breathe. Pt states her pain is different all over her body. Pt states she feels like she needs to be in a ball. Pt appears anxious upon arrival. Pt able to feel all four extremities.      Jessica Mcfarlane RN  07/22/21 8794

## 2021-07-22 NOTE — ED NOTES
ED nurse-to-nurse bedside report    Chief Complaint   Patient presents with    Chest Pain    Back Pain    Neck Pain      LOC: alert and orientated to name, place, date  Vital signs   Vitals:    07/22/21 0252 07/22/21 0308 07/22/21 0613   BP: 129/78 133/83 115/81   Pulse: 98 103 120   Resp: 19 20 20   Temp: 98.3 °F (36.8 °C)     TempSrc: Oral     SpO2: 100% 100% 100%   Weight: 180 lb (81.6 kg)     Height: 5' 5\" (1.651 m)        Pain:    Pain Interventions: ativan   Pain Goal: 0  Oxygen: No    Current needs required room air    Telemetry: Yes  LDAs:   Peripheral IV 07/22/21 Left Antecubital (Active)   Site Assessment Clean;Dry; Intact 07/22/21 0614   Line Status Normal saline locked 07/22/21 0614   Dressing Status Clean;Dry; Intact 07/22/21 0614     Continuous Infusions:   Mobility: Independent  Valdez Fall Risk Score: No flowsheet data found.   Fall Interventions: side rails up bed locked and in lowest position call light in reach   Report given to: [facility] RICHARD Pires RN  07/22/21 0207

## 2021-07-24 LAB — URINE CULTURE REFLEX: NORMAL

## 2021-07-26 ASSESSMENT — ENCOUNTER SYMPTOMS
SORE THROAT: 0
EYE DISCHARGE: 0
BLOOD IN STOOL: 0
VOICE CHANGE: 0
WHEEZING: 0
DIARRHEA: 0
EYE ITCHING: 0
CHOKING: 0
ABDOMINAL PAIN: 0
EYE PAIN: 0
SINUS PRESSURE: 0
ABDOMINAL DISTENTION: 0
CONSTIPATION: 0
NAUSEA: 0
PHOTOPHOBIA: 0
CHEST TIGHTNESS: 0
EYE REDNESS: 0
TROUBLE SWALLOWING: 0
RHINORRHEA: 0
SHORTNESS OF BREATH: 0
COUGH: 0
BACK PAIN: 1
VOMITING: 0

## 2024-05-28 ENCOUNTER — HOSPITAL ENCOUNTER (OUTPATIENT)
Dept: CT IMAGING | Age: 29
Discharge: HOME | End: 2024-05-28
Attending: RADIOLOGY

## 2024-05-28 DIAGNOSIS — Z00.6 ENCOUNTER FOR EXAMINATION FOR NORMAL COMPARISON OR CONTROL IN CLINICAL RESEARCH PROGRAM: ICD-10-CM
